# Patient Record
Sex: FEMALE | Race: OTHER | Employment: STUDENT | URBAN - METROPOLITAN AREA
[De-identification: names, ages, dates, MRNs, and addresses within clinical notes are randomized per-mention and may not be internally consistent; named-entity substitution may affect disease eponyms.]

---

## 2022-12-07 ENCOUNTER — OFFICE VISIT (OUTPATIENT)
Dept: FAMILY MEDICINE CLINIC | Facility: CLINIC | Age: 22
End: 2022-12-07
Payer: COMMERCIAL

## 2022-12-07 VITALS
OXYGEN SATURATION: 98 % | SYSTOLIC BLOOD PRESSURE: 123 MMHG | DIASTOLIC BLOOD PRESSURE: 82 MMHG | HEART RATE: 88 BPM | TEMPERATURE: 99 F | RESPIRATION RATE: 18 BRPM

## 2022-12-07 DIAGNOSIS — Z01.89 PATIENT REQUEST FOR DIAGNOSTIC TESTING: ICD-10-CM

## 2022-12-07 DIAGNOSIS — R68.89 FLU-LIKE SYMPTOMS: Primary | ICD-10-CM

## 2022-12-07 LAB
CONTROL LINE PRESENT WITH A CLEAR BACKGROUND (YES/NO): YES YES/NO
KIT LOT #: NORMAL NUMERIC
STREP GRP A CUL-SCR: NEGATIVE

## 2022-12-07 PROCEDURE — 3074F SYST BP LT 130 MM HG: CPT | Performed by: NURSE PRACTITIONER

## 2022-12-07 PROCEDURE — 3079F DIAST BP 80-89 MM HG: CPT | Performed by: NURSE PRACTITIONER

## 2022-12-07 PROCEDURE — 99202 OFFICE O/P NEW SF 15 MIN: CPT | Performed by: NURSE PRACTITIONER

## 2022-12-07 PROCEDURE — 87637 SARSCOV2&INF A&B&RSV AMP PRB: CPT | Performed by: NURSE PRACTITIONER

## 2022-12-07 PROCEDURE — 87880 STREP A ASSAY W/OPTIC: CPT | Performed by: NURSE PRACTITIONER

## 2022-12-09 LAB
FLUAV + FLUBV RNA SPEC NAA+PROBE: NOT DETECTED
FLUAV + FLUBV RNA SPEC NAA+PROBE: NOT DETECTED
RSV RNA SPEC NAA+PROBE: NOT DETECTED
SARS-COV-2 RNA RESP QL NAA+PROBE: DETECTED

## 2023-02-23 ENCOUNTER — OFFICE VISIT (OUTPATIENT)
Dept: OBGYN CLINIC | Facility: CLINIC | Age: 23
End: 2023-02-23

## 2023-02-23 ENCOUNTER — LAB ENCOUNTER (OUTPATIENT)
Dept: LAB | Age: 23
End: 2023-02-23
Attending: OBSTETRICS & GYNECOLOGY
Payer: COMMERCIAL

## 2023-02-23 VITALS
SYSTOLIC BLOOD PRESSURE: 109 MMHG | DIASTOLIC BLOOD PRESSURE: 67 MMHG | HEIGHT: 60 IN | WEIGHT: 146 LBS | BODY MASS INDEX: 28.66 KG/M2 | HEART RATE: 76 BPM

## 2023-02-23 DIAGNOSIS — Z11.3 SCREEN FOR STD (SEXUALLY TRANSMITTED DISEASE): ICD-10-CM

## 2023-02-23 DIAGNOSIS — Z13.220 ENCOUNTER FOR SCREENING FOR LIPID DISORDER: ICD-10-CM

## 2023-02-23 DIAGNOSIS — Z13.29 THYROID DISORDER SCREENING: ICD-10-CM

## 2023-02-23 DIAGNOSIS — N92.6 IRREGULAR MENSES: ICD-10-CM

## 2023-02-23 DIAGNOSIS — Z01.419 ENCOUNTER FOR WELL WOMAN EXAM WITH ROUTINE GYNECOLOGICAL EXAM: Primary | ICD-10-CM

## 2023-02-23 DIAGNOSIS — Z13.1 SCREENING FOR DIABETES MELLITUS (DM): ICD-10-CM

## 2023-02-23 LAB
CHOLEST SERPL-MCNC: 180 MG/DL (ref ?–200)
DHEA-S SERPL-MCNC: 166 UG/DL
EST. AVERAGE GLUCOSE BLD GHB EST-MCNC: 105 MG/DL (ref 68–126)
ESTRADIOL SERPL-MCNC: 46.8 PG/ML
FASTING PATIENT GLUCOSE ANSWER: YES
FASTING PATIENT LIPID ANSWER: YES
FSH SERPL-ACNC: 6 MIU/ML
GLUCOSE BLD-MCNC: 97 MG/DL (ref 70–99)
HBA1C MFR BLD: 5.3 % (ref ?–5.7)
HBV SURFACE AG SER-ACNC: <0.1 [IU]/L
HBV SURFACE AG SERPL QL IA: NONREACTIVE
HCV AB SERPL QL IA: NONREACTIVE
HDLC SERPL-MCNC: 76 MG/DL (ref 40–59)
INSULIN SERPL-ACNC: 9.4 MU/L (ref 3–25)
LDLC SERPL CALC-MCNC: 93 MG/DL (ref ?–100)
LH SERPL-ACNC: 10 MIU/ML
NONHDLC SERPL-MCNC: 104 MG/DL (ref ?–130)
PROLACTIN SERPL-MCNC: 10.4 NG/ML
T PALLIDUM AB SER QL IA: NONREACTIVE
TRIGL SERPL-MCNC: 59 MG/DL (ref 30–149)
TSI SER-ACNC: 1.19 MIU/ML (ref 0.36–3.74)
VLDLC SERPL CALC-MCNC: 10 MG/DL (ref 0–30)

## 2023-02-23 PROCEDURE — 82627 DEHYDROEPIANDROSTERONE: CPT

## 2023-02-23 PROCEDURE — 83036 HEMOGLOBIN GLYCOSYLATED A1C: CPT

## 2023-02-23 PROCEDURE — 82670 ASSAY OF TOTAL ESTRADIOL: CPT

## 2023-02-23 PROCEDURE — 84403 ASSAY OF TOTAL TESTOSTERONE: CPT

## 2023-02-23 PROCEDURE — 80061 LIPID PANEL: CPT

## 2023-02-23 PROCEDURE — 84443 ASSAY THYROID STIM HORMONE: CPT

## 2023-02-23 PROCEDURE — 3074F SYST BP LT 130 MM HG: CPT | Performed by: OBSTETRICS & GYNECOLOGY

## 2023-02-23 PROCEDURE — 87591 N.GONORRHOEAE DNA AMP PROB: CPT

## 2023-02-23 PROCEDURE — 36415 COLL VENOUS BLD VENIPUNCTURE: CPT

## 2023-02-23 PROCEDURE — 83002 ASSAY OF GONADOTROPIN (LH): CPT

## 2023-02-23 PROCEDURE — 99385 PREV VISIT NEW AGE 18-39: CPT | Performed by: OBSTETRICS & GYNECOLOGY

## 2023-02-23 PROCEDURE — 87340 HEPATITIS B SURFACE AG IA: CPT

## 2023-02-23 PROCEDURE — 86803 HEPATITIS C AB TEST: CPT

## 2023-02-23 PROCEDURE — 87389 HIV-1 AG W/HIV-1&-2 AB AG IA: CPT

## 2023-02-23 PROCEDURE — 83525 ASSAY OF INSULIN: CPT

## 2023-02-23 PROCEDURE — 3008F BODY MASS INDEX DOCD: CPT | Performed by: OBSTETRICS & GYNECOLOGY

## 2023-02-23 PROCEDURE — 87491 CHLMYD TRACH DNA AMP PROBE: CPT

## 2023-02-23 PROCEDURE — 84146 ASSAY OF PROLACTIN: CPT

## 2023-02-23 PROCEDURE — 83001 ASSAY OF GONADOTROPIN (FSH): CPT

## 2023-02-23 PROCEDURE — 84402 ASSAY OF FREE TESTOSTERONE: CPT

## 2023-02-23 PROCEDURE — 82947 ASSAY GLUCOSE BLOOD QUANT: CPT

## 2023-02-23 PROCEDURE — 86780 TREPONEMA PALLIDUM: CPT

## 2023-02-23 PROCEDURE — 3078F DIAST BP <80 MM HG: CPT | Performed by: OBSTETRICS & GYNECOLOGY

## 2023-02-23 RX ORDER — MULTIVITAMIN
TABLET ORAL
COMMUNITY

## 2023-02-23 RX ORDER — LEVOCETIRIZINE DIHYDROCHLORIDE 5 MG/1
5 TABLET, FILM COATED ORAL EVERY EVENING
COMMUNITY

## 2023-02-24 LAB
C TRACH DNA SPEC QL NAA+PROBE: NEGATIVE
N GONORRHOEA DNA SPEC QL NAA+PROBE: NEGATIVE

## 2023-03-01 LAB
SEX HORMONE BINDING GLOBULIN: 33 NMOL/L
TESTOSTERONE -MS, BIOAVAILAB: 21.1 NG/DL
TESTOSTERONE, -MS/MS: 45 NG/DL
TESTOSTERONE, FREE -MS/MS: 7.4 PG/ML

## 2023-03-02 ENCOUNTER — TELEPHONE (OUTPATIENT)
Dept: OBGYN CLINIC | Facility: CLINIC | Age: 23
End: 2023-03-02

## 2023-03-02 NOTE — TELEPHONE ENCOUNTER
Called patient and left a voicemail regarding results. Informed patient she needs to scheduled a follow up appointment after she completes ultrasound.

## 2023-03-02 NOTE — TELEPHONE ENCOUNTER
----- Message from Norma Shaikh MD sent at 2/26/2023  1:03 PM CST -----  Elevated E2 and insulin - needs ultrasound and f/u appt Norma Shaikh MD

## 2023-03-06 ENCOUNTER — TELEPHONE (OUTPATIENT)
Dept: OBGYN CLINIC | Facility: CLINIC | Age: 23
End: 2023-03-06

## 2023-03-06 NOTE — TELEPHONE ENCOUNTER
----- Message from Suzie Mora MD sent at 3/3/2023 11:02 AM CST -----  Needs ultrasound for elevated E2 and Testosterone.  Suzie Mora MD

## 2023-03-06 NOTE — TELEPHONE ENCOUNTER
Called and left message for patient to call back to scheduled an office visit to discuss elevated estradiol and Testerone. Dr. Goran Landon would like to do an in office ultrasound.

## 2023-03-07 ENCOUNTER — HOSPITAL ENCOUNTER (OUTPATIENT)
Dept: ULTRASOUND IMAGING | Age: 23
Discharge: HOME | End: 2023-03-07
Attending: OBSTETRICS & GYNECOLOGY
Payer: COMMERCIAL

## 2023-03-07 DIAGNOSIS — N92.6 IRREGULAR MENSES: ICD-10-CM

## 2023-03-07 PROCEDURE — 76856 US EXAM PELVIC COMPLETE: CPT | Performed by: OBSTETRICS & GYNECOLOGY

## 2023-03-07 PROCEDURE — 76830 TRANSVAGINAL US NON-OB: CPT | Performed by: OBSTETRICS & GYNECOLOGY

## 2023-03-09 ENCOUNTER — OFFICE VISIT (OUTPATIENT)
Dept: FAMILY MEDICINE CLINIC | Facility: CLINIC | Age: 23
End: 2023-03-09
Payer: COMMERCIAL

## 2023-03-09 VITALS
HEART RATE: 84 BPM | DIASTOLIC BLOOD PRESSURE: 68 MMHG | OXYGEN SATURATION: 98 % | WEIGHT: 146 LBS | BODY MASS INDEX: 28.66 KG/M2 | RESPIRATION RATE: 18 BRPM | SYSTOLIC BLOOD PRESSURE: 115 MMHG | HEIGHT: 60 IN | TEMPERATURE: 98 F

## 2023-03-09 DIAGNOSIS — R35.0 URINARY FREQUENCY: ICD-10-CM

## 2023-03-09 DIAGNOSIS — R30.0 DYSURIA: Primary | ICD-10-CM

## 2023-03-09 LAB
APPEARANCE: CLEAR
BILIRUBIN: NEGATIVE
GLUCOSE (URINE DIPSTICK): NEGATIVE MG/DL
KETONES (URINE DIPSTICK): NEGATIVE MG/DL
LEUKOCYTES: NEGATIVE
MULTISTIX LOT#: ABNORMAL NUMERIC
NITRITE, URINE: NEGATIVE
PH, URINE: 5.5 (ref 4.5–8)
PROTEIN (URINE DIPSTICK): NEGATIVE MG/DL
SPECIFIC GRAVITY: 1.01 (ref 1–1.03)
URINE-COLOR: YELLOW
UROBILINOGEN,SEMI-QN: 0.2 MG/DL (ref 0–1.9)

## 2023-03-09 PROCEDURE — 81003 URINALYSIS AUTO W/O SCOPE: CPT | Performed by: NURSE PRACTITIONER

## 2023-03-09 PROCEDURE — 87086 URINE CULTURE/COLONY COUNT: CPT | Performed by: NURSE PRACTITIONER

## 2023-03-09 PROCEDURE — 3078F DIAST BP <80 MM HG: CPT | Performed by: NURSE PRACTITIONER

## 2023-03-09 PROCEDURE — 99213 OFFICE O/P EST LOW 20 MIN: CPT | Performed by: NURSE PRACTITIONER

## 2023-03-09 PROCEDURE — 3074F SYST BP LT 130 MM HG: CPT | Performed by: NURSE PRACTITIONER

## 2023-03-09 PROCEDURE — 3008F BODY MASS INDEX DOCD: CPT | Performed by: NURSE PRACTITIONER

## 2023-03-17 ENCOUNTER — OFFICE VISIT (OUTPATIENT)
Dept: OBGYN CLINIC | Facility: CLINIC | Age: 23
End: 2023-03-17

## 2023-03-17 DIAGNOSIS — E28.2 PCOS (POLYCYSTIC OVARIAN SYNDROME): Primary | ICD-10-CM

## 2023-03-17 PROCEDURE — 99214 OFFICE O/P EST MOD 30 MIN: CPT | Performed by: OBSTETRICS & GYNECOLOGY

## 2023-03-17 RX ORDER — NORGESTIMATE AND ETHINYL ESTRADIOL 0.25-0.035
1 KIT ORAL DAILY
Qty: 84 TABLET | Refills: 4 | Status: SHIPPED | OUTPATIENT
Start: 2023-03-17 | End: 2024-03-16

## 2023-03-17 NOTE — PATIENT INSTRUCTIONS
Dietary changes and exercise and weight loss are cornerstones of management of PCOS / diabetes / obesity /  weight loss      Check EPA list of 'Clean 15' and 'dirty dozen' while deciding to buy organic   Organic foods have less pesticides and chemical contaminants      - Diet changes :   Best evidence is for whole food plant based/mediterraean diet for heart health/diabetes (based on [employer], Valders study, PURE study, Pritikin)  Small portion sized meals. Low carb - 30-40 gm with each meal.  NO Sugar, Soda, Juices and Sweets. --- Fill half of your plate with low carb veggies and greens as discussed. -- eat low sugar fruits apple , pears and berries : blueberries, strawberries, raspberries etc    -- avoid tropical fruits like pineapple,ilana , grapes , watermelon, papaya   -- about 25% fruits and 75% veggies  to reduce sugar intake)  More freshly prepared meals than frozen,   More plant based foods than processed meats. Incorporate good fats - Eg :Extra virgin olive oil, Nuts, Avocado. Provided information handouts on carb counting and obesity. Consider ''Time restricted eating / feeding '' also called ''intermittent fasting'': (Resource:  \"The Complete Guide to Fasting\" by Dr Garcia Brian)  Eating all your small healthy meals in a narrow window 6-8 hour and only drinking water or non sugar or non carb liquids ( black tea, black coffee or green tea) rest of the time with a minimum overnight fasting periods of 12 hours     consider starting B complex 1 tablet daily      Exercise :   Brisk walk 30-45 min everyday or a total of 150 min per week    Weight loss target :   10 % with 1/2-1 lb per week over 6 to 12 months      Follow up 3 months

## 2025-03-15 ENCOUNTER — APPOINTMENT (OUTPATIENT)
Dept: CT IMAGING | Age: 25
End: 2025-03-15
Attending: STUDENT IN AN ORGANIZED HEALTH CARE EDUCATION/TRAINING PROGRAM
Payer: COMMERCIAL

## 2025-03-15 ENCOUNTER — HOSPITAL ENCOUNTER (INPATIENT)
Facility: HOSPITAL | Age: 25
LOS: 2 days | Discharge: HOME | End: 2025-03-18
Attending: STUDENT IN AN ORGANIZED HEALTH CARE EDUCATION/TRAINING PROGRAM | Admitting: STUDENT IN AN ORGANIZED HEALTH CARE EDUCATION/TRAINING PROGRAM
Payer: COMMERCIAL

## 2025-03-15 DIAGNOSIS — K37 APPENDICITIS: ICD-10-CM

## 2025-03-15 DIAGNOSIS — K35.32 ACUTE APPENDICITIS WITH RUPTURE: Primary | ICD-10-CM

## 2025-03-15 LAB
ALBUMIN SERPL-MCNC: 4.9 G/DL (ref 3.2–4.8)
ALBUMIN/GLOB SERPL: 1.5 {RATIO} (ref 1–2)
ALP LIVER SERPL-CCNC: 84 U/L
ALT SERPL-CCNC: 18 U/L
ANION GAP SERPL CALC-SCNC: 7 MMOL/L (ref 0–18)
AST SERPL-CCNC: 20 U/L (ref ?–34)
B-HCG UR QL: NEGATIVE
BASOPHILS # BLD AUTO: 0.02 X10(3) UL (ref 0–0.2)
BASOPHILS NFR BLD AUTO: 0.1 %
BILIRUB SERPL-MCNC: 1 MG/DL (ref 0.3–1.2)
BILIRUB UR QL CFM: NEGATIVE
BUN BLD-MCNC: 12 MG/DL (ref 9–23)
CALCIUM BLD-MCNC: 9.8 MG/DL (ref 8.7–10.6)
CHLORIDE SERPL-SCNC: 102 MMOL/L (ref 98–112)
CO2 SERPL-SCNC: 25 MMOL/L (ref 21–32)
COLOR UR AUTO: YELLOW
CREAT BLD-MCNC: 0.74 MG/DL
EGFRCR SERPLBLD CKD-EPI 2021: 116 ML/MIN/1.73M2 (ref 60–?)
EOSINOPHIL # BLD AUTO: 0.26 X10(3) UL (ref 0–0.7)
EOSINOPHIL NFR BLD AUTO: 1.8 %
ERYTHROCYTE [DISTWIDTH] IN BLOOD BY AUTOMATED COUNT: 13.1 %
GLOBULIN PLAS-MCNC: 3.2 G/DL (ref 2–3.5)
GLUCOSE BLD-MCNC: 116 MG/DL (ref 70–99)
GLUCOSE UR STRIP.AUTO-MCNC: NEGATIVE MG/DL
HCT VFR BLD AUTO: 36.4 %
HGB BLD-MCNC: 12.5 G/DL
IMM GRANULOCYTES # BLD AUTO: 0.06 X10(3) UL (ref 0–1)
IMM GRANULOCYTES NFR BLD: 0.4 %
KETONES UR STRIP.AUTO-MCNC: >=160 MG/DL
LIPASE SERPL-CCNC: 45 U/L (ref 12–53)
LYMPHOCYTES # BLD AUTO: 1.15 X10(3) UL (ref 1–4)
LYMPHOCYTES NFR BLD AUTO: 8 %
MCH RBC QN AUTO: 32.1 PG (ref 26–34)
MCHC RBC AUTO-ENTMCNC: 34.3 G/DL (ref 31–37)
MCV RBC AUTO: 93.3 FL
MONOCYTES # BLD AUTO: 0.9 X10(3) UL (ref 0.1–1)
MONOCYTES NFR BLD AUTO: 6.2 %
NEUTROPHILS # BLD AUTO: 12.05 X10 (3) UL (ref 1.5–7.7)
NEUTROPHILS # BLD AUTO: 12.05 X10(3) UL (ref 1.5–7.7)
NEUTROPHILS NFR BLD AUTO: 83.5 %
NITRITE UR QL STRIP.AUTO: NEGATIVE
OSMOLALITY SERPL CALC.SUM OF ELEC: 279 MOSM/KG (ref 275–295)
PH UR STRIP.AUTO: 6 [PH] (ref 5–8)
PLATELET # BLD AUTO: 243 10(3)UL (ref 150–450)
POTASSIUM SERPL-SCNC: 3.8 MMOL/L (ref 3.5–5.1)
PROT SERPL-MCNC: 8.1 G/DL (ref 5.7–8.2)
RBC # BLD AUTO: 3.9 X10(6)UL
SODIUM SERPL-SCNC: 134 MMOL/L (ref 136–145)
SP GR UR STRIP.AUTO: 1.02 (ref 1–1.03)
UROBILINOGEN UR STRIP.AUTO-MCNC: 4 MG/DL
WBC # BLD AUTO: 14.4 X10(3) UL (ref 4–11)

## 2025-03-15 PROCEDURE — 74177 CT ABD & PELVIS W/CONTRAST: CPT | Performed by: STUDENT IN AN ORGANIZED HEALTH CARE EDUCATION/TRAINING PROGRAM

## 2025-03-15 RX ORDER — KETOROLAC TROMETHAMINE 15 MG/ML
15 INJECTION, SOLUTION INTRAMUSCULAR; INTRAVENOUS ONCE
Status: COMPLETED | OUTPATIENT
Start: 2025-03-15 | End: 2025-03-15

## 2025-03-15 RX ORDER — MORPHINE SULFATE 4 MG/ML
4 INJECTION, SOLUTION INTRAMUSCULAR; INTRAVENOUS ONCE
Status: COMPLETED | OUTPATIENT
Start: 2025-03-15 | End: 2025-03-15

## 2025-03-15 RX ORDER — MAGNESIUM HYDROXIDE/ALUMINUM HYDROXICE/SIMETHICONE 120; 1200; 1200 MG/30ML; MG/30ML; MG/30ML
30 SUSPENSION ORAL ONCE
Status: COMPLETED | OUTPATIENT
Start: 2025-03-15 | End: 2025-03-15

## 2025-03-16 ENCOUNTER — ANESTHESIA (OUTPATIENT)
Dept: SURGERY | Facility: HOSPITAL | Age: 25
End: 2025-03-16
Payer: COMMERCIAL

## 2025-03-16 ENCOUNTER — ANESTHESIA EVENT (OUTPATIENT)
Dept: SURGERY | Facility: HOSPITAL | Age: 25
End: 2025-03-16
Payer: COMMERCIAL

## 2025-03-16 PROCEDURE — 0DTJ4ZZ RESECTION OF APPENDIX, PERCUTANEOUS ENDOSCOPIC APPROACH: ICD-10-PCS | Performed by: STUDENT IN AN ORGANIZED HEALTH CARE EDUCATION/TRAINING PROGRAM

## 2025-03-16 PROCEDURE — 44970 LAPAROSCOPY APPENDECTOMY: CPT | Performed by: STUDENT IN AN ORGANIZED HEALTH CARE EDUCATION/TRAINING PROGRAM

## 2025-03-16 PROCEDURE — 99222 1ST HOSP IP/OBS MODERATE 55: CPT | Performed by: STUDENT IN AN ORGANIZED HEALTH CARE EDUCATION/TRAINING PROGRAM

## 2025-03-16 PROCEDURE — 3E0T3BZ INTRODUCTION OF ANESTHETIC AGENT INTO PERIPHERAL NERVES AND PLEXI, PERCUTANEOUS APPROACH: ICD-10-PCS | Performed by: INTERNAL MEDICINE

## 2025-03-16 RX ORDER — HYDROMORPHONE HYDROCHLORIDE 1 MG/ML
0.2 INJECTION, SOLUTION INTRAMUSCULAR; INTRAVENOUS; SUBCUTANEOUS EVERY 5 MIN PRN
Status: DISCONTINUED | OUTPATIENT
Start: 2025-03-16 | End: 2025-03-16 | Stop reason: HOSPADM

## 2025-03-16 RX ORDER — ONDANSETRON 2 MG/ML
INJECTION INTRAMUSCULAR; INTRAVENOUS AS NEEDED
Status: DISCONTINUED | OUTPATIENT
Start: 2025-03-16 | End: 2025-03-16 | Stop reason: SURG

## 2025-03-16 RX ORDER — NALOXONE HYDROCHLORIDE 0.4 MG/ML
0.08 INJECTION, SOLUTION INTRAMUSCULAR; INTRAVENOUS; SUBCUTANEOUS AS NEEDED
Status: DISCONTINUED | OUTPATIENT
Start: 2025-03-16 | End: 2025-03-16 | Stop reason: HOSPADM

## 2025-03-16 RX ORDER — DEXAMETHASONE SODIUM PHOSPHATE 4 MG/ML
VIAL (ML) INJECTION AS NEEDED
Status: DISCONTINUED | OUTPATIENT
Start: 2025-03-16 | End: 2025-03-16 | Stop reason: SURG

## 2025-03-16 RX ORDER — ACETAMINOPHEN 500 MG
1000 TABLET ORAL ONCE AS NEEDED
Status: DISCONTINUED | OUTPATIENT
Start: 2025-03-16 | End: 2025-03-16 | Stop reason: HOSPADM

## 2025-03-16 RX ORDER — DIPHENHYDRAMINE HYDROCHLORIDE 50 MG/ML
12.5 INJECTION, SOLUTION INTRAMUSCULAR; INTRAVENOUS AS NEEDED
Status: DISCONTINUED | OUTPATIENT
Start: 2025-03-16 | End: 2025-03-16 | Stop reason: HOSPADM

## 2025-03-16 RX ORDER — ONDANSETRON 2 MG/ML
4 INJECTION INTRAMUSCULAR; INTRAVENOUS EVERY 6 HOURS PRN
Status: DISCONTINUED | OUTPATIENT
Start: 2025-03-16 | End: 2025-03-16 | Stop reason: HOSPADM

## 2025-03-16 RX ORDER — ACETAMINOPHEN 500 MG
1000 TABLET ORAL EVERY 8 HOURS SCHEDULED
Status: DISCONTINUED | OUTPATIENT
Start: 2025-03-16 | End: 2025-03-18

## 2025-03-16 RX ORDER — ONDANSETRON 2 MG/ML
INJECTION INTRAMUSCULAR; INTRAVENOUS
Status: COMPLETED
Start: 2025-03-16 | End: 2025-03-16

## 2025-03-16 RX ORDER — HYDROMORPHONE HYDROCHLORIDE 1 MG/ML
0.8 INJECTION, SOLUTION INTRAMUSCULAR; INTRAVENOUS; SUBCUTANEOUS EVERY 2 HOUR PRN
Status: DISCONTINUED | OUTPATIENT
Start: 2025-03-16 | End: 2025-03-18

## 2025-03-16 RX ORDER — SODIUM CHLORIDE, SODIUM LACTATE, POTASSIUM CHLORIDE, CALCIUM CHLORIDE 600; 310; 30; 20 MG/100ML; MG/100ML; MG/100ML; MG/100ML
INJECTION, SOLUTION INTRAVENOUS CONTINUOUS
Status: DISCONTINUED | OUTPATIENT
Start: 2025-03-16 | End: 2025-03-16 | Stop reason: HOSPADM

## 2025-03-16 RX ORDER — SODIUM CHLORIDE 9 MG/ML
INJECTION, SOLUTION INTRAVENOUS CONTINUOUS
Status: DISCONTINUED | OUTPATIENT
Start: 2025-03-16 | End: 2025-03-16

## 2025-03-16 RX ORDER — ROCURONIUM BROMIDE 10 MG/ML
INJECTION, SOLUTION INTRAVENOUS AS NEEDED
Status: DISCONTINUED | OUTPATIENT
Start: 2025-03-16 | End: 2025-03-16 | Stop reason: SURG

## 2025-03-16 RX ORDER — HYDROCODONE BITARTRATE AND ACETAMINOPHEN 5; 325 MG/1; MG/1
2 TABLET ORAL ONCE AS NEEDED
Status: DISCONTINUED | OUTPATIENT
Start: 2025-03-16 | End: 2025-03-16 | Stop reason: HOSPADM

## 2025-03-16 RX ORDER — SODIUM CHLORIDE, SODIUM LACTATE, POTASSIUM CHLORIDE, CALCIUM CHLORIDE 600; 310; 30; 20 MG/100ML; MG/100ML; MG/100ML; MG/100ML
INJECTION, SOLUTION INTRAVENOUS CONTINUOUS PRN
Status: DISCONTINUED | OUTPATIENT
Start: 2025-03-16 | End: 2025-03-16 | Stop reason: SURG

## 2025-03-16 RX ORDER — PHENYLEPHRINE HCL 10 MG/ML
VIAL (ML) INJECTION AS NEEDED
Status: DISCONTINUED | OUTPATIENT
Start: 2025-03-16 | End: 2025-03-16 | Stop reason: SURG

## 2025-03-16 RX ORDER — PROCHLORPERAZINE EDISYLATE 5 MG/ML
5 INJECTION INTRAMUSCULAR; INTRAVENOUS EVERY 8 HOURS PRN
Status: DISCONTINUED | OUTPATIENT
Start: 2025-03-16 | End: 2025-03-18

## 2025-03-16 RX ORDER — LIDOCAINE HYDROCHLORIDE 10 MG/ML
INJECTION, SOLUTION EPIDURAL; INFILTRATION; INTRACAUDAL; PERINEURAL AS NEEDED
Status: DISCONTINUED | OUTPATIENT
Start: 2025-03-16 | End: 2025-03-16 | Stop reason: SURG

## 2025-03-16 RX ORDER — MEPERIDINE HYDROCHLORIDE 25 MG/ML
12.5 INJECTION INTRAMUSCULAR; INTRAVENOUS; SUBCUTANEOUS AS NEEDED
Status: DISCONTINUED | OUTPATIENT
Start: 2025-03-16 | End: 2025-03-16 | Stop reason: HOSPADM

## 2025-03-16 RX ORDER — PROCHLORPERAZINE EDISYLATE 5 MG/ML
5 INJECTION INTRAMUSCULAR; INTRAVENOUS EVERY 8 HOURS PRN
Status: DISCONTINUED | OUTPATIENT
Start: 2025-03-16 | End: 2025-03-16 | Stop reason: HOSPADM

## 2025-03-16 RX ORDER — HYDROMORPHONE HYDROCHLORIDE 1 MG/ML
0.6 INJECTION, SOLUTION INTRAMUSCULAR; INTRAVENOUS; SUBCUTANEOUS EVERY 5 MIN PRN
Status: DISCONTINUED | OUTPATIENT
Start: 2025-03-16 | End: 2025-03-16 | Stop reason: HOSPADM

## 2025-03-16 RX ORDER — HEPARIN SODIUM 5000 [USP'U]/ML
5000 INJECTION, SOLUTION INTRAVENOUS; SUBCUTANEOUS EVERY 12 HOURS SCHEDULED
Status: DISCONTINUED | OUTPATIENT
Start: 2025-03-16 | End: 2025-03-18

## 2025-03-16 RX ORDER — KETOROLAC TROMETHAMINE 30 MG/ML
30 INJECTION, SOLUTION INTRAMUSCULAR; INTRAVENOUS EVERY 6 HOURS
Status: DISCONTINUED | OUTPATIENT
Start: 2025-03-16 | End: 2025-03-18

## 2025-03-16 RX ORDER — ONDANSETRON 2 MG/ML
4 INJECTION INTRAMUSCULAR; INTRAVENOUS EVERY 6 HOURS PRN
Status: DISCONTINUED | OUTPATIENT
Start: 2025-03-16 | End: 2025-03-18

## 2025-03-16 RX ORDER — OXYCODONE HYDROCHLORIDE 5 MG/1
10 TABLET ORAL EVERY 4 HOURS PRN
Status: DISCONTINUED | OUTPATIENT
Start: 2025-03-16 | End: 2025-03-18

## 2025-03-16 RX ORDER — HYDROCODONE BITARTRATE AND ACETAMINOPHEN 5; 325 MG/1; MG/1
1 TABLET ORAL ONCE AS NEEDED
Status: DISCONTINUED | OUTPATIENT
Start: 2025-03-16 | End: 2025-03-16 | Stop reason: HOSPADM

## 2025-03-16 RX ORDER — OXYCODONE HYDROCHLORIDE 5 MG/1
5 TABLET ORAL EVERY 4 HOURS PRN
Status: DISCONTINUED | OUTPATIENT
Start: 2025-03-16 | End: 2025-03-18

## 2025-03-16 RX ORDER — HYDROMORPHONE HYDROCHLORIDE 1 MG/ML
0.4 INJECTION, SOLUTION INTRAMUSCULAR; INTRAVENOUS; SUBCUTANEOUS EVERY 5 MIN PRN
Status: DISCONTINUED | OUTPATIENT
Start: 2025-03-16 | End: 2025-03-16 | Stop reason: HOSPADM

## 2025-03-16 RX ORDER — HYDROMORPHONE HYDROCHLORIDE 1 MG/ML
0.4 INJECTION, SOLUTION INTRAMUSCULAR; INTRAVENOUS; SUBCUTANEOUS EVERY 2 HOUR PRN
Status: DISCONTINUED | OUTPATIENT
Start: 2025-03-16 | End: 2025-03-18

## 2025-03-16 RX ORDER — LIDOCAINE HYDROCHLORIDE AND EPINEPHRINE 10; 10 MG/ML; UG/ML
INJECTION, SOLUTION INFILTRATION; PERINEURAL AS NEEDED
Status: DISCONTINUED | OUTPATIENT
Start: 2025-03-16 | End: 2025-03-16 | Stop reason: HOSPADM

## 2025-03-16 RX ORDER — BUPIVACAINE HYDROCHLORIDE 2.5 MG/ML
INJECTION, SOLUTION EPIDURAL; INFILTRATION; INTRACAUDAL; PERINEURAL AS NEEDED
Status: DISCONTINUED | OUTPATIENT
Start: 2025-03-16 | End: 2025-03-16 | Stop reason: HOSPADM

## 2025-03-16 RX ORDER — MIDAZOLAM HYDROCHLORIDE 1 MG/ML
1 INJECTION INTRAMUSCULAR; INTRAVENOUS EVERY 5 MIN PRN
Status: DISCONTINUED | OUTPATIENT
Start: 2025-03-16 | End: 2025-03-16 | Stop reason: HOSPADM

## 2025-03-16 RX ORDER — METRONIDAZOLE 500 MG/100ML
INJECTION, SOLUTION INTRAVENOUS AS NEEDED
Status: DISCONTINUED | OUTPATIENT
Start: 2025-03-16 | End: 2025-03-16 | Stop reason: SURG

## 2025-03-16 RX ADMIN — ROCURONIUM BROMIDE 50 MG: 10 INJECTION, SOLUTION INTRAVENOUS at 08:02:00

## 2025-03-16 RX ADMIN — SODIUM CHLORIDE, SODIUM LACTATE, POTASSIUM CHLORIDE, CALCIUM CHLORIDE: 600; 310; 30; 20 INJECTION, SOLUTION INTRAVENOUS at 07:55:00

## 2025-03-16 RX ADMIN — DEXAMETHASONE SODIUM PHOSPHATE 4 MG: 4 MG/ML VIAL (ML) INJECTION at 08:09:00

## 2025-03-16 RX ADMIN — PHENYLEPHRINE HCL 50 MCG: 10 MG/ML VIAL (ML) INJECTION at 08:11:00

## 2025-03-16 RX ADMIN — ONDANSETRON 4 MG: 2 INJECTION INTRAMUSCULAR; INTRAVENOUS at 09:45:00

## 2025-03-16 RX ADMIN — METRONIDAZOLE 500 MG: 500 INJECTION, SOLUTION INTRAVENOUS at 08:08:00

## 2025-03-16 RX ADMIN — ROCURONIUM BROMIDE 5 MG: 10 INJECTION, SOLUTION INTRAVENOUS at 08:39:00

## 2025-03-16 RX ADMIN — ROCURONIUM BROMIDE 5 MG: 10 INJECTION, SOLUTION INTRAVENOUS at 08:57:00

## 2025-03-16 RX ADMIN — LIDOCAINE HYDROCHLORIDE 50 MG: 10 INJECTION, SOLUTION EPIDURAL; INFILTRATION; INTRACAUDAL; PERINEURAL at 08:02:00

## 2025-03-16 NOTE — ED QUICK NOTES
Orders for admission, patient is aware of plan and ready to go upstairs. Any questions, please call ED RN Khris at extension [phone].     Patient Covid vaccination status: Fully vaccinated     COVID Test Ordered in ED: None    COVID Suspicion at Admission: N/A    Running Infusions:  None    Mental Status/LOC at time of transport: A&O*4/4    Other pertinent information:   CIWA score: N/A   NIH score:  N/A

## 2025-03-16 NOTE — ED PROVIDER NOTES
History     Chief Complaint   Patient presents with    Abdomen/Flank Pain       HPI    25 year old female presents with abdominal pain.  Thursday patient states she had a couple apples and afterwards felt a lot of bloating, abdominal discomfort, emesis.  Since then has had difficulty with oral intake due to poor appetite, shortly had some crackers today.  No further vomiting or diarrhea.  She has dysuria since yesterday.  She feels dehydrated.  Went to an urgent care and was advised to come to the ER.  She does have a history of PCOS but does not have the symptoms due to the condition previously.  LMP January, typically irregular for patient.          Past Medical History:    PCOS (polycystic ovarian syndrome)    Seasonal allergies       History reviewed. No pertinent surgical history.    Social History     Socioeconomic History    Marital status: Single   Tobacco Use    Smoking status: Never    Smokeless tobacco: Never   Vaping Use    Vaping status: Never Used   Substance and Sexual Activity    Alcohol use: Yes     Comment: occ    Drug use: Not Currently                   Physical Exam     ED Triage Vitals [03/15/25 2025]   /63   Pulse 117   Resp 18   Temp 98.5 °F (36.9 °C)   Temp src Oral   SpO2 96 %   O2 Device None (Room air)       Physical Exam  Constitutional:       General: She is in acute distress.   Eyes:      Extraocular Movements: Extraocular movements intact.   Cardiovascular:      Rate and Rhythm: Normal rate.      Pulses: Normal pulses.   Pulmonary:      Effort: Pulmonary effort is normal. No respiratory distress.   Abdominal:      General: There is distension.      Palpations: Abdomen is soft.      Tenderness: There is abdominal tenderness in the right lower quadrant. There is no guarding or rebound.   Musculoskeletal:      Cervical back: Normal range of motion.   Neurological:      General: No focal deficit present.      Mental Status: She is alert.              ED Course     Labs Reviewed    URINALYSIS, ROUTINE - Abnormal; Notable for the following components:       Result Value    Clarity Urine Cloudy (*)     Ketones Urine >=160 (*)     Blood Urine Small (*)     Protein Urine 30 mg/dL (*)     Urobilinogen Urine 4.0 (*)     Leukocyte Esterase Urine Trace (*)     All other components within normal limits   UA MICROSCOPIC ONLY, URINE - Abnormal; Notable for the following components:    Bacteria Urine 1+ (*)     Squamous Epi. Cells Many (*)     All other components within normal limits   CBC WITH DIFFERENTIAL WITH PLATELET - Abnormal; Notable for the following components:    WBC 14.4 (*)     Neutrophil Absolute Prelim 12.05 (*)     Neutrophil Absolute 12.05 (*)     All other components within normal limits   COMP METABOLIC PANEL (14) - Abnormal; Notable for the following components:    Glucose 116 (*)     Sodium 134 (*)     Albumin 4.9 (*)     All other components within normal limits   ICTOTEST - Normal   LIPASE - Normal   POCT PREGNANCY URINE - Normal   SCAN SLIDE     CT ABDOMEN+PELVIS(CONTRAST ONLY)(CPT=74177)    Result Date: 3/15/2025  CONCLUSION:   1. Findings concerning for ruptured acute appendicitis.  There are multiple appendicoliths noted along the course of the appendix measuring up to 6 mm.  There are moderate to severe inflammatory changes surrounding the appendix extending into the right lower quadrant.  The appendix has a reference diameter of approximately 12 mm.  There is secondary inflammation of the surrounding small bowel loops.  There is a small amount of free fluid tracking into the pelvis.  No free air or abscess formation.  Clinical correlation recommended.  2. Findings concerning for cystitis.  Clinical correlation recommended.  3. Diffuse fatty infiltration of the liver.   Critical results were discussed with Dr. Bishop at 2137 hours on 3/15/2025. Critical results were read back.  LOCATION:  [facility]   Dictated by (CST): Jaron Ortiz MD on 3/15/2025 at 9:34 PM     Finalized by  (CST): Jaron Ortiz MD on 3/15/2025 at 9:38 PM            [facility]     Vitals:    03/15/25 2025 03/15/25 2159   BP: 111/63 114/59   Pulse: 117 97   Resp: 18 18   Temp: 98.5 °F (36.9 °C)    TempSrc: Oral    SpO2: 96% 100%   Weight: 51.3 kg    Height: 152.4 cm (5')        Colitis, gastroenteritis, appendicitis, cystitis on differential.  Uncomfortable but nontoxic-appearing.  Labs, urine, CT.    ED Course as of 03/15/25 2205  ------------------------------------------------------------  Time: 03/15 2121  Comment: Urinalysis consistent with dehydration, leukocytosis noted.  Labs otherwise with reassuring renal function and LFTs, lipase normal.  ------------------------------------------------------------  Time: 03/15 2144  Comment: My interpretation of CT with inflammatory changes of the right lower quadrant.  Radiologist is noting what appears to be ruptured appendicitis.  Free fluid, associated inflammatory changes.  Will start on antibiotics and consult surgery, patient will be admitted for further care         Disposition and Plan     Clinical Impression:  1. Acute appendicitis with rupture        Disposition:  Admit    Follow-up:  No follow-up provider specified.    Medications Prescribed:  Current Discharge Medication List          Hospital Problems       Present on Admission  Date Reviewed: 3/17/2023            ICD-10-CM Noted POA    * (Principal) Acute appendicitis with rupture K35.32 3/15/2025 Unknown

## 2025-03-16 NOTE — PLAN OF CARE
NURSING ADMISSION NOTE      Patient admitted to unit via wheelchair. Pt is here with acute appendicitis rupture. Pt is AOx4, RA, VSS, up ad bradley. No c/o pain. IVF infusing at 100ml/hr. IV ABX.  Family at bedside and updated on POC.  Patient admitted via Cart  Oriented to room.  Safety precautions initiated.  Bed in low position.  Call light in reach.

## 2025-03-16 NOTE — ANESTHESIA PREPROCEDURE EVALUATION
PRE-OP EVALUATION    Patient Name: Demi Mullins    Admit Diagnosis: Acute appendicitis with rupture [K35.32]    Pre-op Diagnosis: Appendicitis [K37]    LAPAROSCOPIC APPENDECTOMY POSSIBLE OPEN    Anesthesia Procedure: LAPAROSCOPIC APPENDECTOMY POSSIBLE OPEN (Abdomen)    Surgeons and Role:     * Luz Elena Sullivan MD - Primary    Pre-op vitals reviewed.  Temp: 97.9 °F (36.6 °C)  Pulse: 89  Resp: 18  BP: 98/48  SpO2: 98 %  Body mass index is 22.07 kg/m².    Current medications reviewed.  Hospital Medications:   sodium chloride 0.9% infusion   Intravenous Continuous    heparin (Porcine) 5000 UNIT/ML injection 5,000 Units  5,000 Units Subcutaneous 2 times per day    acetaminophen (Tylenol Extra Strength) tab 1,000 mg  1,000 mg Oral Q8H SHILPI    ketorolac (Toradol) 30 MG/ML injection 30 mg  30 mg Intravenous Q6H    oxyCODONE immediate release tab 5 mg  5 mg Oral Q4H PRN    Or    oxyCODONE immediate release tab 10 mg  10 mg Oral Q4H PRN    HYDROmorphone (Dilaudid) 1 MG/ML injection 0.4 mg  0.4 mg Intravenous Q2H PRN    Or    HYDROmorphone (Dilaudid) 1 MG/ML injection 0.8 mg  0.8 mg Intravenous Q2H PRN    ondansetron (Zofran) 4 MG/2ML injection 4 mg  4 mg Intravenous Q6H PRN    prochlorperazine (Compazine) 10 MG/2ML injection 5 mg  5 mg Intravenous Q8H PRN    piperacillin-tazobactam (Zosyn) 3.375 g in dextrose 5% 100 mL IVPB-ADDV  3.375 g Intravenous Q8H    [COMPLETED] sodium chloride 0.9 % IV bolus 1,000 mL  1,000 mL Intravenous Once    [COMPLETED] alum-mag hydroxide-simethicone (Maalox) 200-200-20 MG/5ML oral suspension 30 mL  30 mL Oral Once    [COMPLETED] ketorolac (Toradol) 15 MG/ML injection 15 mg  15 mg Intravenous Once    [COMPLETED] iopamidol 76% (ISOVUE-370) injection for power injector  80 mL Intravenous ONCE PRN    [COMPLETED] piperacillin-tazobactam (Zosyn) 4.5 g in dextrose 5% 100 mL IVPB-ADDV  4.5 g Intravenous Once    [COMPLETED] morphINE PF 4 MG/ML injection 4 mg  4 mg Intravenous Once       Outpatient  Medications:   Prescriptions Prior to Admission[1]    Allergies: Patient has no known allergies.      Anesthesia Evaluation    Patient summary reviewed.    Anesthetic Complications  (-) history of anesthetic complications         GI/Hepatic/Renal    Negative GI/hepatic/renal ROS.                             Cardiovascular    Negative cardiovascular ROS.                                                   Endo/Other    Negative endo/other ROS.                              Pulmonary    Negative pulmonary ROS.                       Neuro/Psych    Negative neuro/psych ROS.                                  History reviewed. No pertinent surgical history.  Social History     Socioeconomic History    Marital status: Single   Tobacco Use    Smoking status: Never    Smokeless tobacco: Never   Vaping Use    Vaping status: Never Used   Substance and Sexual Activity    Alcohol use: Yes     Comment: occ    Drug use: Not Currently     History   Drug Use Unknown     Available pre-op labs reviewed.  Lab Results   Component Value Date    WBC 14.4 (H) 03/15/2025    RBC 3.90 03/15/2025    HGB 12.5 03/15/2025    HCT 36.4 03/15/2025    MCV 93.3 03/15/2025    MCH 32.1 03/15/2025    MCHC 34.3 03/15/2025    RDW 13.1 03/15/2025    .0 03/15/2025     Lab Results   Component Value Date     (L) 03/15/2025    K 3.8 03/15/2025     03/15/2025    CO2 25.0 03/15/2025    BUN 12 03/15/2025    CREATSERUM 0.74 03/15/2025     (H) 03/15/2025    CA 9.8 03/15/2025            Airway      Mallampati: III  Mouth opening: 3 FB  TM distance: 4 - 6 cm   Cardiovascular             Dental             Pulmonary                     Other findings              ASA: 1 and emergent  Plan: general  NPO status verified and     Post-procedure pain management plan discussed with surgeon and patient.    Comment: GETA/LMA discussed in detail.  Risk of complications discussed including but not limited to sore throat, cough, PONV discussed. Also,  discussed risks including dental injury particularly on any weakened, treated or diseased teeth & pt wishes to proceed  All questions answered.    Plan/risks discussed with: patient                Present on Admission:  **None**             [1]   Medications Prior to Admission   Medication Sig Dispense Refill Last Dose/Taking    Multiple Vitamin (MULTI-VITAMIN DAILY) Oral Tab Take by mouth.   Past Week    Norgestimate-Eth Estradiol (ORTHO-CYCLEN, 28,) 0.25-35 MG-MCG Oral Tab Take 1 tablet by mouth daily. 84 tablet 4     levocetirizine 5 MG Oral Tab Take 1 tablet (5 mg total) by mouth every evening. (Patient not taking: Reported on 3/15/2025)   Unknown

## 2025-03-16 NOTE — ANESTHESIA PROCEDURE NOTES
Airway  Date/Time: 3/16/2025 8:05 AM  Urgency: elective      General Information and Staff    Patient location during procedure: OR  Anesthesiologist: Wally Jamison MD  Performed: anesthesiologist   Performed by: Wally Jamison MD  Authorized by: Wally Jamison MD      Indications and Patient Condition  Indications for airway management: anesthesia  Sedation level: deep  Preoxygenated: yes  Patient position: sniffing  Mask difficulty assessment: 1 - vent by mask    Final Airway Details  Final airway type: endotracheal airway      Successful airway: ETT  Cuffed: yes   Successful intubation technique: Video laryngoscopy  Endotracheal tube insertion site: oral  Blade: GlideScope  Blade size: #3  ETT size (mm): 7.0    Placement verified by: capnometry   Cuff volume (mL): 7  Measured from: lips  ETT to lips (cm): 21  Number of attempts at approach: 1    Additional Comments  atraumatic

## 2025-03-16 NOTE — OPERATIVE REPORT
OPERATIVE NOTE    Demi Mullins Location: OR   CSN [id number] MRN [id number]   Admission Date 3/15/2025 Operation Date 3/16/2025   Attending Physician Luz Elena Sullivan MD Operating Physician Luz Elena Sullivan MD     PREOPERATIVE DIAGNOSIS  Acute perforated appendicitis    POSTOPERATIVE DIAGNOSIS  Acute gangrenous appendicitis with purulent peritonitis    PROCEDURE PERFORMED  Laparoscopic appendectomy  Transversus abdominis plane block  Drain placement    SURGEON  Luz Elena Sullivan MD    ASSISTANTS  None    ANESTHESIA  General    FINDINGS   Gangrenous appendicitis without any evidence of perforation, purulence throughout the abdomen, soft cecum allowing for appendectomy    INDICATIONS  25 year old female presented to hospital with 1 day history of abdominal pain. CT imaging revealed dilated appendix consistent with acute appendicitis. Patient had elevated leukocytosis and localized peritonitis on exam. Surgery was indicated.    DESCRIPTION OF PROCEDURE  After informed consent, patient was brought to the operating room and placed in supine position. Bilateral SCDs were placed and pre-operative antibiotics administered. Anesthesia was induced without any complication.  Bagley catheter was placed.  Patient was prepped and draped in the usual sterile fashion. Time out was performed confirming patient, procedure, and site.    The Veress needle was used to gain pneumoperitoneum. Using blade, a curvilinear supraumbilical incision was made. Veress needle was inserted just under the umbilical stalk with audible clicks.  On aspiration, there was no bowel contents.  Saline flowed easily confirming presence in the abdomen.  Pneumoperitoneum was created with CO2. An 12mm port was inserted. Upon entrance, no injury was noted to omentum or bowel at site of entrance.  Two 5mm ports were placed under direct visualization; the first left lower quadrant and the second in the suprapubic, triangulating toward the location of the appendix.  Transversus abdominis plane block was performed.    Patient did have some purulent material throughout the abdomen and in the pelvis.  A sample was sent for culture as peritoneal fluid.  Using graspers, bowel was retracted and the appendix identified.  Appendix was necrotic appearing.  It had twisted on the mesoappendix.  The ligament of Treves was draped circumferentially around the base of the appendix.  Due to the inflammation, everything was stuck right in the right lower quadrant.  At this time, I decided to mobilize cecum from the lateral attachments to better identify the anatomy.  This was done with LigaSure.  The ligament of Treves and all the attachments to the retroperitoneum were divided to reveal the base of the appendix.  Eventually, I was able to mobilize the cecum and see the base of the appendix which was soft, allowing for appendectomy.  Using endo KATE stapler with tan load, the appendix was divided. Staple line at cecum and mesoappendix were both hemostatic after.  There was a small serosal tear right at the staple line on the cecum.  This was covered with a piece of the ligament of Treves and anchored with clip applier.  Abdomen was irrigated. Appendix was placed in endocatch bag and removed from abdomen for pathology.  A 19 [place] Wally drain was placed in the pelvis and came out of the suprapubic incision.  Drain was anchored to the skin with 2-0 nylon.  Using Sai Patel, fascia at umbilicus was closed with o vicryl. Pneumoperitoneum was evacuated. Skin incisions were closed with 4-o monocryl. Incisions were washed and dressed with dermabond.    At the end of the case, all counts were correct. Patient tolerated procedure well. Patient was awakened and transferred to PACU in a hemodynamically stable condition.    SPECIMENS REMOVED  Appendix    ESTIMATED BLOOD LOSS  15 ml    COMPLICATIONS  None     Luz Elena Sullivan MD

## 2025-03-16 NOTE — ANESTHESIA POSTPROCEDURE EVALUATION
[facility]    Demi Mullins Patient Status:  Inpatient   Age/Gender 25 year old female MRN [id number]   Location [facility] POST ANESTHESIA CARE UNIT Attending Luz Elena Sullivan MD   Hosp Day # 0 PCP Steve Calix MD       Anesthesia Post-op Note    LAPAROSCOPIC APPENDECTOMY    Procedure Summary       Date: 03/16/25 Room / Location:  MAIN OR 08 / [facility] MAIN OR    Anesthesia Start: 0755 Anesthesia Stop: 1009    Procedure: LAPAROSCOPIC APPENDECTOMY (Abdomen) Diagnosis:       Appendicitis      (Appendicitis [K37])    Surgeons: Luz Elena Sullivan MD Anesthesiologist: Wally Jamison MD    Anesthesia Type: general ASA Status: 1 - Emergent            Anesthesia Type: general    Vitals Value Taken Time   /54 03/16/25 1006   Temp 99.1 °F (37.3 °C) 03/16/25 1001   Pulse 95 03/16/25 1008   Resp 13 03/16/25 1008   SpO2 94 % 03/16/25 1008   Vitals shown include unfiled device data.        Patient Location: PACU    Anesthesia Type: general    Airway Patency: patent    Postop Pain Control: adequate    Mental Status: mildly sedated but able to meaningfully participate in the post-anesthesia evaluation    Nausea/Vomiting: none    Cardiopulmonary/Hydration status: stable euvolemic    Complications: no apparent anesthesia related complications    Postop vital signs: stable    Dental Exam: Unchanged from Preop    Patient to be discharged from PACU when criteria met.

## 2025-03-16 NOTE — H&P
[facility]  Report of Surgical History and Physical Exam    Demi Mullins Patient Status:  Inpatient    2000 MRN [id number]   Location [facility] PRE OP HOLDING Attending Luz lEena Sullivan MD   Hosp Day # 0 PCP Steve Calix MD     Chief Complaint: Abdominal pain    History of Present Illness:  Demi Mullins is a a(n) 25 year old female who presented to [place] emergency room with 3-day history of abdominal pain.  Patient reports associated nausea and vomiting.  In the emergency room, she was found to be tachycardic but otherwise hemodynamically stable.  She had a leukocytosis to 14.  CT imaging was performed which showed a ruptured appendix with free fluid and a dilated inflamed appendix.  Patient was transferred to [facility].  General surgery was called for further evaluation and management.  On interview today, patient reports continued abdominal pain.  She denies any nausea or vomiting.  She denies any abdominal surgeries.      History:  Past Medical History:    PCOS (polycystic ovarian syndrome)    Seasonal allergies       History reviewed. No pertinent surgical history.    Family History   Problem Relation Age of Onset    Other (thyroid) Mother         thyroid    Cancer Maternal Grandmother         ovarian cancer    Hypertension Maternal Grandfather     Diabetes Maternal Grandfather     Stroke Other     Hypertension Other     Diabetes Other         reports that she has never smoked. She has never used smokeless tobacco. She reports current alcohol use. She reports that she does not currently use drugs.      Allergies:  Allergies[1]      Medications:    Current Facility-Administered Medications:     sodium chloride 0.9% infusion, , Intravenous, Continuous    [Transfer Hold] heparin (Porcine) 5000 UNIT/ML injection 5,000 Units, 5,000 Units, Subcutaneous, 2 times per day    [Transfer Hold] acetaminophen (Tylenol Extra Strength) tab 1,000 mg, 1,000 mg, Oral, Q8H SHILPI    [Transfer Hold]  ketorolac (Toradol) 30 MG/ML injection 30 mg, 30 mg, Intravenous, Q6H    [Transfer Hold] oxyCODONE immediate release tab 5 mg, 5 mg, Oral, Q4H PRN **OR** [Transfer Hold] oxyCODONE immediate release tab 10 mg, 10 mg, Oral, Q4H PRN    [Transfer Hold] HYDROmorphone (Dilaudid) 1 MG/ML injection 0.4 mg, 0.4 mg, Intravenous, Q2H PRN **OR** [Transfer Hold] HYDROmorphone (Dilaudid) 1 MG/ML injection 0.8 mg, 0.8 mg, Intravenous, Q2H PRN    [Transfer Hold] ondansetron (Zofran) 4 MG/2ML injection 4 mg, 4 mg, Intravenous, Q6H PRN    [Transfer Hold] prochlorperazine (Compazine) 10 MG/2ML injection 5 mg, 5 mg, Intravenous, Q8H PRN    piperacillin-tazobactam (Zosyn) 3.375 g in dextrose 5% 100 mL IVPB-ADDV, 3.375 g, Intravenous, Q8H      Review of Systems:  The review of systems was negative other than the above listed HPI and past medical history including the HEENT, Heart, Lungs, GI, , Neuro, Musculoskeletal, Hematologic, Endocrine and Psych.       Physical Exam:  Blood pressure 98/48, pulse 89, temperature 97.9 °F (36.6 °C), temperature source Oral, resp. rate 18, height 60\", weight 113 lb (51.3 kg), last menstrual period 01/02/2025, SpO2 98%.  General: Alert, orientated x3.  Cooperative.  No apparent distress.  HEENT: Exam is unremarkable.  Without scleral icterus.  Mucous membranes are moist. EOM are WNL.  Neck: No tenderness to palpitation.  Full range of motion to flexion and extension, lateral rotation and lateral flexion of cervical spine.  No JVD. Supple.   Lungs: Bilateral chest rise. Good excursion of the diaphragms. No secondary use of accessory respiratory musculature.  Cardiac: Regular rate and rhythm. No murmur.  Abdomen: Soft, tender to palpation in right lower quadrant, nondistended  Extremities:  No lower extremity edema noted.  Without clubbing or cyanosis.  2+ pulses x4  Skin: Normal texture and turgor.      Laboratory Data and Relevant Imaging:  Recent Labs   Lab 03/15/25  2052   RBC 3.90   HGB 12.5   HCT  36.4   MCV 93.3   MCH 32.1   MCHC 34.3   RDW 13.1   NEPRELIM 12.05*   WBC 14.4*   .0       Recent Labs   Lab 03/15/25  2052   *   BUN 12   CREATSERUM 0.74   CA 9.8   ALB 4.9*   *   K 3.8      CO2 25.0   ALKPHO 84   AST 20   ALT 18   BILT 1.0   TP 8.1         No results for input(s): \"PTP\", \"INR\", \"PTT\" in the last 168 hours.    Imaging  CT ABDOMEN+PELVIS(CONTRAST ONLY)(CPT=74177)    Result Date: 3/15/2025  CONCLUSION:   1. Findings concerning for ruptured acute appendicitis.  There are multiple appendicoliths noted along the course of the appendix measuring up to 6 mm.  There are moderate to severe inflammatory changes surrounding the appendix extending into the right lower quadrant.  The appendix has a reference diameter of approximately 12 mm.  There is secondary inflammation of the surrounding small bowel loops.  There is a small amount of free fluid tracking into the pelvis.  No free air or abscess formation.  Clinical correlation recommended.  2. Findings concerning for cystitis.  Clinical correlation recommended.  3. Diffuse fatty infiltration of the liver.   Critical results were discussed with Dr. Bishop at 2137 hours on 3/15/2025. Critical results were read back.  LOCATION:  [place]   Dictated by (CST): Jaron Ortiz MD on 3/15/2025 at 9:34 PM     Finalized by (CST): Jaron Ortiz MD on 3/15/2025 at 9:38 PM            Impression/Plan:  Patient Active Problem List   Diagnosis    Acute appendicitis with rupture       25 year old female with acute perforated appendicitis without abscess    CT images were reviewed personally by me  CT shows a dilated and inflamed appendix with some surrounding free fluid but there is no collection consistent with an abscess  On physical exam, patient continues to be tender to palpation  I recommend proceeding to the operating room for appendectomy  We will plan for laparoscopic appendectomy, possible open  Procedure explained to the patient  Risks  including bleeding, pain, infection, abscess formation, postoperative ileus, colon resection, and need for further intervention all discussed  Due to the patient possibly being perforated, she may have a drain placed  Postoperatively, patient cannot lift anything heavier than 15 pounds for 4 weeks    Based on intraoperative findings, patient may be admitted to the hospital for IV antibiotics    Thank you for letting me participate in the care of this patient    Luz Elena Sullivan MD  3/16/2025  7:44 AM         [1] No Known Allergies

## 2025-03-16 NOTE — ED INITIAL ASSESSMENT (HPI)
RLQ abd pain since Thursday night. States started after eating 2 apples. Did have vomiting Thursday night but no vomiting since. States has also been constipated and has burning with urination.  Went to urgent care today, had blood work and a urine sample and was ordered outpatient ct ordered but did not have it done.

## 2025-03-17 LAB
ANION GAP SERPL CALC-SCNC: 7 MMOL/L (ref 0–18)
BASOPHILS # BLD AUTO: 0.02 X10(3) UL (ref 0–0.2)
BASOPHILS NFR BLD AUTO: 0.2 %
BUN BLD-MCNC: 9 MG/DL (ref 9–23)
CALCIUM BLD-MCNC: 9.1 MG/DL (ref 8.7–10.6)
CHLORIDE SERPL-SCNC: 104 MMOL/L (ref 98–112)
CO2 SERPL-SCNC: 30 MMOL/L (ref 21–32)
CREAT BLD-MCNC: 0.69 MG/DL
EGFRCR SERPLBLD CKD-EPI 2021: 124 ML/MIN/1.73M2 (ref 60–?)
EOSINOPHIL # BLD AUTO: 0.01 X10(3) UL (ref 0–0.7)
EOSINOPHIL NFR BLD AUTO: 0.1 %
ERYTHROCYTE [DISTWIDTH] IN BLOOD BY AUTOMATED COUNT: 12.8 %
GLUCOSE BLD-MCNC: 95 MG/DL (ref 70–99)
HCT VFR BLD AUTO: 32.9 %
HGB BLD-MCNC: 11 G/DL
IMM GRANULOCYTES # BLD AUTO: 0.03 X10(3) UL (ref 0–1)
IMM GRANULOCYTES NFR BLD: 0.3 %
LYMPHOCYTES # BLD AUTO: 1.5 X10(3) UL (ref 1–4)
LYMPHOCYTES NFR BLD AUTO: 15.3 %
MAGNESIUM SERPL-MCNC: 2.1 MG/DL (ref 1.6–2.6)
MCH RBC QN AUTO: 31.3 PG (ref 26–34)
MCHC RBC AUTO-ENTMCNC: 33.4 G/DL (ref 31–37)
MCV RBC AUTO: 93.5 FL
MONOCYTES # BLD AUTO: 0.6 X10(3) UL (ref 0.1–1)
MONOCYTES NFR BLD AUTO: 6.1 %
NEUTROPHILS # BLD AUTO: 7.67 X10 (3) UL (ref 1.5–7.7)
NEUTROPHILS # BLD AUTO: 7.67 X10(3) UL (ref 1.5–7.7)
NEUTROPHILS NFR BLD AUTO: 78 %
OSMOLALITY SERPL CALC.SUM OF ELEC: 290 MOSM/KG (ref 275–295)
PLATELET # BLD AUTO: 220 10(3)UL (ref 150–450)
POTASSIUM SERPL-SCNC: 3.7 MMOL/L (ref 3.5–5.1)
RBC # BLD AUTO: 3.52 X10(6)UL
SODIUM SERPL-SCNC: 141 MMOL/L (ref 136–145)
WBC # BLD AUTO: 9.8 X10(3) UL (ref 4–11)

## 2025-03-17 RX ORDER — SODIUM CHLORIDE 9 MG/ML
INJECTION, SOLUTION INTRAVENOUS CONTINUOUS
Status: DISCONTINUED | OUTPATIENT
Start: 2025-03-17 | End: 2025-03-18

## 2025-03-17 NOTE — PAYOR COMM NOTE
--------------  ADMISSION REVIEW   3/16-3/17  Payor: [employer] [facility] [employer]  Subscriber #:  [id number]  Authorization Number: [id number]    Admit date: 3/16/25  Admit time: 12:45 AM       REVIEW DOCUMENTATION:  ED Provider Notes signed by Armond Bishop MD at 3/15/2025 10:05 PM      History     Chief Complaint   Patient presents with    Abdomen/Flank Pain     25 year old female presents with abdominal pain.  Thursday patient states she had a couple apples and afterwards felt a lot of bloating, abdominal discomfort, emesis.  Since then has had difficulty with oral intake due to poor appetite, shortly had some crackers today.  No further vomiting or diarrhea.  She has dysuria since yesterday.  She feels dehydrated.  Went to an urgent care and was advised to come to the ER.  She does have a history of PCOS but does not have the symptoms due to the condition previously.  LMP January, typically irregular for patient.    Past Medical History:    PCOS (polycystic ovarian syndrome)    Seasonal allergies     Physical Exam     ED Triage Vitals [03/15/25 2025]   /63   Pulse 117   Resp 18   Temp 98.5 °F (36.9 °C)   Temp src Oral   SpO2 96 %   O2 Device None (Room air)       Physical Exam  Constitutional:       General: She is in acute distress.   Eyes:      Extraocular Movements: Extraocular movements intact.   Cardiovascular:      Rate and Rhythm: Normal rate.      Pulses: Normal pulses.   Pulmonary:      Effort: Pulmonary effort is normal. No respiratory distress.   Abdominal:      General: There is distension.      Palpations: Abdomen is soft.      Tenderness: There is abdominal tenderness in the right lower quadrant. There is no guarding or rebound.   Musculoskeletal:      Cervical back: Normal range of motion.   Neurological:      General: No focal deficit present.      Mental Status: She is alert.        ED Course     Labs Reviewed   URINALYSIS, ROUTINE - Abnormal; Notable for the following components:        Result Value    Clarity Urine Cloudy (*)     Ketones Urine >=160 (*)     Blood Urine Small (*)     Protein Urine 30 mg/dL (*)     Urobilinogen Urine 4.0 (*)     Leukocyte Esterase Urine Trace (*)     All other components within normal limits   UA MICROSCOPIC ONLY, URINE - Abnormal; Notable for the following components:    Bacteria Urine 1+ (*)     Squamous Epi. Cells Many (*)     All other components within normal limits   CBC WITH DIFFERENTIAL WITH PLATELET - Abnormal; Notable for the following components:    WBC 14.4 (*)     Neutrophil Absolute Prelim 12.05 (*)     Neutrophil Absolute 12.05 (*)     All other components within normal limits   COMP METABOLIC PANEL (14) - Abnormal; Notable for the following components:    Glucose 116 (*)     Sodium 134 (*)     Albumin 4.9 (*)     All other components within normal limits   ICTOTEST - Normal   LIPASE - Normal   POCT PREGNANCY URINE - Normal   SCAN SLIDE     CT ABDOMEN+PELVIS(CONTRAST ONLY)(CPT=74177)    Result Date: 3/15/2025  CONCLUSION:   1. Findings concerning for ruptured acute appendicitis.  There are multiple appendicoliths noted along the course of the appendix measuring up to 6 mm.  There are moderate to severe inflammatory changes surrounding the appendix extending into the right lower quadrant.  The appendix has a reference diameter of approximately 12 mm.  There is secondary inflammation of the surrounding small bowel loops.  There is a small amount of free fluid tracking into the pelvis.  No free air or abscess formation.  Clinical correlation recommended.  2. Findings concerning for cystitis.  Clinical correlation recommended.  3. Diffuse fatty infiltration of the liver.   Critical results were discussed with Dr. Bishop at 2137 hours on 3/15/2025. Critical results were read back.  LOCATION:  [place]   Dictated by (CST): Jaron Ortiz MD on 3/15/2025 at 9:34 PM     Finalized by (CST): Jaron Ortiz MD on 3/15/2025 at 9:38 PM            MDM     Vitals:     03/15/25 2025 03/15/25 2159   BP: 111/63 114/59   Pulse: 117 97   Resp: 18 18   Temp: 98.5 °F (36.9 °C)    TempSrc: Oral    SpO2: 96% 100%   Weight: 51.3 kg    Height: 152.4 cm (5')        Colitis, gastroenteritis, appendicitis, cystitis on differential.  Uncomfortable but nontoxic-appearing.  Labs, urine, CT.    ED Course as of 03/15/25 2205  ------------------------------------------------------------  Time: 03/15 2121  Comment: Urinalysis consistent with dehydration, leukocytosis noted.  Labs otherwise with reassuring renal function and LFTs, lipase normal.  ------------------------------------------------------------  Time: 03/15 2144  Comment: My interpretation of CT with inflammatory changes of the right lower quadrant.  Radiologist is noting what appears to be ruptured appendicitis.  Free fluid, associated inflammatory changes.  Will start on antibiotics and consult surgery, patient will be admitted for further care         Disposition and Plan     Clinical Impression:  1. Acute appendicitis with rupture    Disposition:  Admit    Hospital Problems       Present on Admission  Date Reviewed: 3/17/2023            ICD-10-CM Noted POA    * (Principal) Acute appendicitis with rupture K35.32 3/15/2025 Unknown                3/16 H&P    Chief Complaint: Abdominal pain     History of Present Illness:  Demi Mullins is a a(n) 25 year old female who presented to [place] emergency room with 3-day history of abdominal pain.  Patient reports associated nausea and vomiting.  In the emergency room, she was found to be tachycardic but otherwise hemodynamically stable.  She had a leukocytosis to 14.  CT imaging was performed which showed a ruptured appendix with free fluid and a dilated inflamed appendix.  Patient was transferred to [facility].  General surgery was called for further evaluation and management.  On interview today, patient reports continued abdominal pain.  She denies any nausea or vomiting.  She  denies any abdominal surgeries.    CT ABDOMEN+PELVIS(CONTRAST ONLY)(CPT=74177)     Result Date: 3/15/2025  CONCLUSION:   1. Findings concerning for ruptured acute appendicitis.  There are multiple appendicoliths noted along the course of the appendix measuring up to 6 mm.  There are moderate to severe inflammatory changes surrounding the appendix extending into the right lower quadrant.  The appendix has a reference diameter of approximately 12 mm.  There is secondary inflammation of the surrounding small bowel loops.  There is a small amount of free fluid tracking into the pelvis.  No free air or abscess formation.  Clinical correlation recommended.  2. Findings concerning for cystitis.  Clinical correlation recommended.  3. Diffuse fatty infiltration of the liver.   Critical results were discussed with Dr. Bishop at 2137 hours on 3/15/2025. Critical results were read back.  LOCATION:  [place]   Dictated by (CST): Jaron Ortiz MD on 3/15/2025 at 9:34 PM     Finalized by (CST): Jaron Ortiz MD on 3/15/2025 at 9:38 PM               Impression/Plan:      Patient Active Problem List   Diagnosis    Acute appendicitis with rupture         25 year old female with acute perforated appendicitis without abscess     CT images were reviewed personally by me  CT shows a dilated and inflamed appendix with some surrounding free fluid but there is no collection consistent with an abscess  On physical exam, patient continues to be tender to palpation  I recommend proceeding to the operating room for appendectomy  We will plan for laparoscopic appendectomy, possible open  Procedure explained to the patient  Risks including bleeding, pain, infection, abscess formation, postoperative ileus, colon resection, and need for further intervention all discussed  Due to the patient possibly being perforated, she may have a drain placed  Postoperatively, patient cannot lift anything heavier than 15 pounds for 4 weeks     Based on  intraoperative findings, patient may be admitted to the hospital for IV antibiotics            3/16    PREOPERATIVE DIAGNOSIS  Acute perforated appendicitis     POSTOPERATIVE DIAGNOSIS  Acute gangrenous appendicitis with purulent peritonitis     PROCEDURE PERFORMED  Laparoscopic appendectomy  Transversus abdominis plane block  Drain placement      FINDINGS              Gangrenous appendicitis without any evidence of perforation, purulence throughout the abdomen, soft cecum allowing for appendectomy            3/17 Surgery    PCP Steve Calix MD      Subjective:  The patient is resting comfortably in the bed today.  She denies nausea or vomiting.  She is tolerating clear liquids well.  She states that she has not ambulated yet.  She denies fever or chills.     Objective/Physical Exam:  General: Alert, orientated x3.  Cooperative.  No apparent distress.  Vital Signs:  Blood pressure 94/55, pulse 63, temperature 97.8 °F (36.6 °C), temperature source Oral, resp. rate 18, height 60\", weight 113 lb (51.3 kg), last menstrual period 01/02/2025, SpO2 98%.    .   Abdomen:  Soft, mildly distended, non tender, with no rebound or     Drain: With serosanguineous output        ketorolac  30 mg Intravenous Q6H    piperacillin-tazobactam  3.375 g Intravenous Q8H            Labs:        Lab Results   Component Value Date     WBC 9.8 03/17/2025     HGB 11.0 03/17/2025     HCT 32.9 03/17/2025     .0 03/17/2025            Lab Results   Component Value Date      03/17/2025     K 3.7 03/17/2025      03/17/2025     CO2 30.0 03/17/2025     BUN 9 03/17/2025     CREATSERUM 0.69 03/17/2025     GLU 95 03/17/2025     CA 9.1 03/17/2025      No results found for: \"PT\", \"INR\"        Assessment      Patient Active Problem List   Diagnosis    Acute appendicitis with rupture         Postop day 1 laparoscopic appendectomy     Plan:  She may start full liquid diet.  Further advancement of diet with return of bowel function.  Continue  IV antibiotics.  Continue IV fluids.  Continue multimodal pain control.  Continue drain care.  Ambulate and up to chair  DVT prophylaxis with heparin  Anticipate discharge to home tomorrow       MEDICATIONS ADMINISTERED IN LAST 1 DAY:  acetaminophen (Tylenol Extra Strength) tab 1,000 mg       Date Action Dose Route User    3/17/2025 0525 Given 1,000 mg Oral Luís Spence RN    3/16/2025 1533 Given 1,000 mg Oral Brenda Nair RN          heparin (Porcine) 5000 UNIT/ML injection 5,000 Units       Date Action Dose Route User    3/17/2025 0754 Given 5,000 Units Subcutaneous (Left Lower Abdomen) Brenda Nair RN    3/16/2025 2120 Given 5,000 Units Subcutaneous (Right Lower Abdomen) Luís Spence RN          ketorolac (Toradol) 30 MG/ML injection 30 mg       Date Action Dose Route User    3/17/2025 0530 Given 30 mg Intravenous Luís Spence RN    3/17/2025 0030 Given 30 mg Intravenous Luís Spence RN    3/16/2025 1813 Given 30 mg Intravenous Brenda Nair RN    3/16/2025 1121 Given 30 mg Intravenous Brenda Nair RN          oxyCODONE immediate release tab 5 mg       Date Action Dose Route User    3/16/2025 2102 Given 5 mg Oral Luís Spence RN          piperacillin-tazobactam (Zosyn) 3.375 g in dextrose 5% 100 mL IVPB-ADDV       Date Action Dose Route User    3/17/2025 0754 New Bag 3.375 g Intravenous Brenda Nair RN    3/17/2025 0032 New Bag 3.375 g Intravenous Luís Spence RN    3/16/2025 1533 New Bag 3.375 g Intravenous Brenda Nair RN          sodium chloride 0.9% infusion       Date Action Dose Route User    3/17/2025 0745 New Bag (none) Intravenous Brenda Nair RN            Vitals (last day)       Date/Time Temp Pulse Resp BP SpO2 Weight O2 Device O2 Flow Rate (L/min) [facility]    03/17/25 1057 98.4 °F (36.9 °C) 76 16 96/52 97 % -- None (Room air) -- SK    03/17/25 0745 97.8 °F (36.6 °C) 63 18 94/55 98 % -- None (Room air) -- NS    03/17/25 0524 -- -- -- -- -- 113 lb (51.3 kg) -- -- EM     03/17/25 0441 97.9 °F (36.6 °C) 77 14 93/50 99 % -- None (Room air) -- RR    03/16/25 2330 98.6 °F (37 °C) 70 16 94/50 96 % -- None (Room air) -- RR    03/16/25 2030 97.9 °F (36.6 °C) 68 16 99/62 97 % -- None (Room air) -- RR    03/16/25 1500 98.9 °F (37.2 °C) 75 18 98/53 96 % -- None (Room air) -- NS    03/16/25 1128 98.1 °F (36.7 °C) 86 16 111/59 94 % -- None (Room air) -- NS    03/16/25 1110 -- 88 15 -- 93 % -- -- -- MB    03/16/25 1105 -- 84 16 -- 93 % -- -- -- MB    03/16/25 1100 -- 82 16 105/57 99 % -- -- -- MB    03/16/25 1055 -- 82 15 -- 99 % -- -- -- MB    03/16/25 1050 -- 87 16 -- 99 % -- -- -- MB    03/16/25 1045 -- 86 17 105/59 99 % -- -- -- MB    03/16/25 1040 -- 90 20 -- 98 % -- -- -- MB    03/16/25 1035 -- 89 17 -- 99 % -- None (Room air) -- MB    03/16/25 1030 97.5 °F (36.4 °C) 89 16 104/60 98 % -- -- -- MB    03/16/25 1025 -- 86 16 -- 99 % -- -- -- MB    03/16/25 1020 -- 89 17 -- 99 % -- -- -- MB    03/16/25 1015 -- 93 18 103/58 97 % -- -- -- MB    03/16/25 1010 -- 98 12 100/54 95 % -- Nasal cannula 3 L/min MB    03/16/25 1005 -- 98 17 102/53 90 % -- -- -- MB    03/16/25 1001 99.1 °F (37.3 °C) 115 17 102/53 91 % -- None (Room air) -- MB    03/16/25 0500 97.9 °F (36.6 °C) 89 18 98/48 98 % -- None (Room air) 0 L/min AF    03/16/25 0035 -- -- 16 109/64 100 % -- None (Room air) 0 L/min AF          CIWA Scores (since admission)       None

## 2025-03-17 NOTE — PLAN OF CARE
Pt A&Ox4. VSS on RA. . Lap sites x2 intact with skin glue. Dressing to LEENA drain site clean and intact. Denies nausea. Pain managed with scheduled tylenol and Toradol. Dtv. Call light within reach. Will continue to monitor.

## 2025-03-17 NOTE — DISCHARGE INSTRUCTIONS
Appendectomy  Dr. Luz Elena Sullivan    MEDICATIONS  For post-operative pain control, the medications are usually oxycodone.  This is a narcotic and is best taken by starting with a half a tablet every four to six hours as needed.  If the patient does not feel they need the narcotics they shouldn’t take them.  If the pain is severe the patient may take a whole pill every six hours.  The patient can take tylenol 1g three times a day and ibuprofen 400-600mg in between up to 4 times a day as needed for pain as well.  The patient can take zofran 4mg every 6 hours as needed for nausea. The patient can also take miralax or colace as needed for constipation. Please ask your surgeon before resuming blood thinners such as aspirin, Plavix or Coumadin.  All other home medications may be resumed as scheduled.  With severe appendicitis, antibiotics will also be prescribed.  With antibiotics, please watch for rash, facial swelling or severe diarrhea.    DIET  The patient may resume a general diet immediately.  This is not a good time to eat excessively.  The patient should eat in moderation and stick with foods the patient feels are easy to digest. There should be no alcohol consumption in the immediate recovery time period or within six hours of taking narcotics.    WOUND CARE  The dressing is usually a dissolvable glue which protects the wound. The patient can take a shower starting the day after surgery, but please, no baths or soaking. Please do not put any creams or ointments on the surgical incisions. If the patient does have a top dressing with clear tape and gauze, this dressing may be removed in 2 days. Do not remove the steri-strips or butterfly tapes that are white and adherent to the skin.  The steri-strips will eventually peel up at the ends and at this point they may be removed.  This is usually seven to ten days after surgery.  When showering, soap can get on the wounds but do not scrub over the wounds.  No hair dye  or chemicals of any kind should get in the wounds.  Avoid tub baths, swimming or sitting in a hot tub for two weeks.  If visible sutures or staples are present they will be removed in the office by the surgeon or nurse.  Most wounds will be closed with dissolving suture underneath the skin.  These sutures will dissolve on their own.  If a drain is present make sure the patient receives drain care instructions from the nurse prior to discharge.  Most patients will not have a drain.    ACTIVITY  Every day the patient should be up, showered and dressed.  Each day the patient should be up and around the house.  The patient should not lie in bed and should not stay in pajamas.  We count on the patient being up, coughing, walking and deep breathing to avoid pneumonia and blood clots in the legs.  Once a day the patient should get out of the house and go shopping, go to the mall, the [employer] store, the [employer] or a restaurant.  The patient may ride in a car but should not drive the car for at least one week.  Patients should be off narcotics for at least 8 hours prior to being a .  The average time off work is 10 to 14 days; most adults will be seeing the surgeon prior to returning to work.  Students will return to school within 1-5 days after discharge from the hospital but will be off gym, sports, and indoors for recess for one month.  Patients may go up and down stairs and lift up to five pounds but no bending, pushing or pulling.  Nothing called work or exercise until the follow up visit.  No ‘stair-master’, power walking, jogging or workouts until the follow up visit.  Patients should seek further activity limits at the time of their appointment.    APPOINTMENT  Please call our office for an appointment within five to ten days of discharge.  Any fever greater than 100.5, chills, nausea, vomiting, or severe diarrhea please call our office.  If the wound turns red, hot, swollen, becomes increasingly painful, or  drains pus call us immediately at [phone].  For life threatening emergencies call 911.  For non-emergent care please call our office after 8:30 a.m. Monday through Friday.  The number listed above is our office number; our phone automatically switches to our answering service if we are not there.  Please do not use the emergency room for non-urgent care.    Thank you for entrusting us with your care.  [facility]--General Surgery

## 2025-03-17 NOTE — PLAN OF CARE
Pt A&Ox4. VSS on RA. . IVF infusing as ordered. Lap sites x2 intact with skin glue. LEENA drain intact with serosanguinous output, dressing clean and dry. Denies nausea. Pain managed with scheduled tylenol and Toradol. Call light within reach. Will continue to monitor.

## 2025-03-17 NOTE — PROGRESS NOTES
[facility]  Progress Note    Demi Mullins Patient Status:  Inpatient    2000 MRN [id number]   Location [facility] 3SW-A Attending Luz Elena Sullivan MD   Hosp Day # 1 PCP Steve Calix MD     Subjective:  The patient is resting comfortably in the bed today.  She denies nausea or vomiting.  She is tolerating clear liquids well.  She states that she has not ambulated yet.  She denies fever or chills.    Objective/Physical Exam:  General: Alert, orientated x3.  Cooperative.  No apparent distress.  Vital Signs:  Blood pressure 94/55, pulse 63, temperature 97.8 °F (36.6 °C), temperature source Oral, resp. rate 18, height 60\", weight 113 lb (51.3 kg), last menstrual period 2025, SpO2 98%.  Wt Readings from Last 3 Encounters:   25 113 lb (51.3 kg)   23 (P) 150 lb 0.4 oz (68.1 kg)   23 146 lb (66.2 kg)     Lungs: No respiratory distress.  Cardiac: Regular rate and rhythm.   Abdomen:  Soft, mildly distended, non tender, with no rebound or guarding.  No peritoneal signs.   Extremities:  No lower extremity edema noted.    Incision: Clean, dry, intact, no erythema  Drain: With serosanguineous output    Intake/Output:    Intake/Output Summary (Last 24 hours) at 3/17/2025 1056  Last data filed at 3/17/2025 0525  Gross per 24 hour   Intake 220 ml   Output 1285 ml   Net -1065 ml     I/O last 3 completed shifts:  In: 1420 [P.O.:220; I.V.:1000; IV PIGGYBACK:200]  Out: 1530 [Urine:1400; Drains:115; Blood:15]  No intake/output data recorded.    Medications:    heparin  5,000 Units Subcutaneous 2 times per day    acetaminophen  1,000 mg Oral Q8H SHILPI    ketorolac  30 mg Intravenous Q6H    piperacillin-tazobactam  3.375 g Intravenous Q8H       Labs:  Lab Results   Component Value Date    WBC 9.8 2025    HGB 11.0 2025    HCT 32.9 2025    .0 2025     Lab Results   Component Value Date     2025    K 3.7 2025     2025    CO2 30.0 2025     BUN 9 03/17/2025    CREATSERUM 0.69 03/17/2025    GLU 95 03/17/2025    CA 9.1 03/17/2025     No results found for: \"PT\", \"INR\"      Assessment  Patient Active Problem List   Diagnosis    Acute appendicitis with rupture       Postop day 1 laparoscopic appendectomy    Plan:  She may start full liquid diet.  Further advancement of diet with return of bowel function.  Continue IV antibiotics.  Continue IV fluids.  Continue multimodal pain control.  Continue drain care.  Ambulate and up to chair  DVT prophylaxis with heparin  Anticipate discharge to home tomorrow patient continues to do well.    Quality:  DVT Mechanical Prophylaxis:   SCDs, Early ambuation  DVT Pharmacologic Prophylaxis   Medication    heparin (Porcine) 5000 UNIT/ML injection 5,000 Units                Code Status: Not on file  Bagley: No urinary catheter in place  Bagley Duration (in days):   Central line:    HOANG: 3/18/2025        Holli Drake PA-C  3/17/2025  10:56 AM      This note was initiated by Holli Drake.  The PA saw the patient in conjunction with me. The PA performed a history, exam, and developed the assessment and plan in conjunction with me. I agree with the above note and have made changes which reflect my own history and physical, if necessary.    Luz Elena Sullivan MD  [facility]  3/17/2025  4:05 PM

## 2025-03-17 NOTE — PLAN OF CARE
Alert and oriented x 4. VSS; on room air. Pain controlled with PRN medication. Up standby with a walker. Voiding without difficulty. Patient on clears, still needs a bowel movement. Drain in place, measuring output. On IV abx. POC discussed with patient. Safety precautions in place. Call light within reach.

## 2025-03-18 VITALS
OXYGEN SATURATION: 97 % | SYSTOLIC BLOOD PRESSURE: 94 MMHG | WEIGHT: 113 LBS | TEMPERATURE: 99 F | RESPIRATION RATE: 18 BRPM | BODY MASS INDEX: 22.19 KG/M2 | DIASTOLIC BLOOD PRESSURE: 56 MMHG | HEART RATE: 68 BPM | HEIGHT: 60 IN

## 2025-03-18 LAB
ANION GAP SERPL CALC-SCNC: 7 MMOL/L (ref 0–18)
BASOPHILS # BLD AUTO: 0.01 X10(3) UL (ref 0–0.2)
BASOPHILS NFR BLD AUTO: 0.2 %
BUN BLD-MCNC: 6 MG/DL (ref 9–23)
CALCIUM BLD-MCNC: 8.5 MG/DL (ref 8.7–10.6)
CHLORIDE SERPL-SCNC: 109 MMOL/L (ref 98–112)
CO2 SERPL-SCNC: 26 MMOL/L (ref 21–32)
CREAT BLD-MCNC: 0.67 MG/DL
EGFRCR SERPLBLD CKD-EPI 2021: 125 ML/MIN/1.73M2 (ref 60–?)
EOSINOPHIL # BLD AUTO: 0.04 X10(3) UL (ref 0–0.7)
EOSINOPHIL NFR BLD AUTO: 0.7 %
ERYTHROCYTE [DISTWIDTH] IN BLOOD BY AUTOMATED COUNT: 12.8 %
GLUCOSE BLD-MCNC: 98 MG/DL (ref 70–99)
HCT VFR BLD AUTO: 31.6 %
HGB BLD-MCNC: 10.2 G/DL
IMM GRANULOCYTES # BLD AUTO: 0.03 X10(3) UL (ref 0–1)
IMM GRANULOCYTES NFR BLD: 0.5 %
LYMPHOCYTES # BLD AUTO: 1.96 X10(3) UL (ref 1–4)
LYMPHOCYTES NFR BLD AUTO: 33.3 %
MAGNESIUM SERPL-MCNC: 1.9 MG/DL (ref 1.6–2.6)
MCH RBC QN AUTO: 30.9 PG (ref 26–34)
MCHC RBC AUTO-ENTMCNC: 32.3 G/DL (ref 31–37)
MCV RBC AUTO: 95.8 FL
MONOCYTES # BLD AUTO: 0.46 X10(3) UL (ref 0.1–1)
MONOCYTES NFR BLD AUTO: 7.8 %
NEUTROPHILS # BLD AUTO: 3.39 X10 (3) UL (ref 1.5–7.7)
NEUTROPHILS # BLD AUTO: 3.39 X10(3) UL (ref 1.5–7.7)
NEUTROPHILS NFR BLD AUTO: 57.5 %
OSMOLALITY SERPL CALC.SUM OF ELEC: 292 MOSM/KG (ref 275–295)
PLATELET # BLD AUTO: 213 10(3)UL (ref 150–450)
POTASSIUM SERPL-SCNC: 3.9 MMOL/L (ref 3.5–5.1)
RBC # BLD AUTO: 3.3 X10(6)UL
SODIUM SERPL-SCNC: 142 MMOL/L (ref 136–145)
WBC # BLD AUTO: 5.9 X10(3) UL (ref 4–11)

## 2025-03-18 RX ORDER — OXYCODONE HYDROCHLORIDE 5 MG/1
5 TABLET ORAL EVERY 4 HOURS PRN
Qty: 15 TABLET | Refills: 0 | Status: SHIPPED | OUTPATIENT
Start: 2025-03-18 | End: 2025-03-24 | Stop reason: ALTCHOICE

## 2025-03-18 NOTE — PLAN OF CARE
Patient for discharge today. Discharge instructions reviewed followup instructions reviewed. Medications reviewed.

## 2025-03-18 NOTE — PROGRESS NOTES
[facility]  General Surgery Progress Note    Demi Mullins Patient Status:  Inpatient    2000 MRN [id number]   Location [facility] 3SW-A Attending Luz Elena Sullivan MD   Hosp Day # 2 PCP Steve Calix MD     Subjective:  No acute events overnight.  Patient continues to do well.  Drain with 20 cc out in past 24 hours.    Objective/Physical Exam:      Intake/Output Summary (Last 24 hours) at 3/18/2025 1041  Last data filed at 3/18/2025 0537  Gross per 24 hour   Intake 240 ml   Output 20 ml   Net 220 ml       Vital Signs:  Blood pressure 94/56, pulse 68, temperature 98.6 °F (37 °C), temperature source Oral, resp. rate 18, height 60\", weight 113 lb (51.3 kg), last menstrual period 2025, SpO2 97%.    General: Alert, orientated x3.  Cooperative.  No apparent distress.  HEENT: Exam is unremarkable.  Without scleral icterus.  Mucous membranes are moist. Oropharynx is clear.  Neck: No JVD. Supple.   Lungs: Non labored breathing, equal chest rise  Cardiac: Regular rate and rhythm. No murmur.  Abdomen:  Soft, non-distended, non-tender, with no rebound or guarding.  No peritoneal signs.  Drain in place with serosanguineous output  Extremities:  No lower extremity edema noted.  Without clubbing or cyanosis.  2+ pulses x4, motor and sensation grossly intact      Labs:  Lab Results   Component Value Date    WBC 5.9 2025    RBC 3.30 2025    HGB 10.2 2025    HCT 31.6 2025    MCV 95.8 2025    MCH 30.9 2025    MCHC 32.3 2025    RDW 12.8 2025    .0 2025     Lab Results   Component Value Date     2025    K 3.9 2025     2025    CO2 26.0 2025    BUN 6 2025    CREATSERUM 0.67 2025    GLU 98 2025    CA 8.5 2025     Lab Results   Component Value Date    MG 1.9 2025       Images:  No results found.    Assessment/Plan:  Patient Active Problem List   Diagnosis    Acute appendicitis with rupture        25 year old female with acute gangrenous appendicitis status post laparoscopic appendectomy    Patient is cleared for discharge  She will follow-up then 5 to 7 days for drain removal  5-day course of antibiotics  Patient can call the office for any questions or concerns    Luz Elena Sullivan MD  [facility] General Surgery  3/18/2025  10:41 AM

## 2025-03-18 NOTE — PLAN OF CARE
Alert and oriented x 4. VSS; on room air. Pain controlled with scheduled medication. Up standby. Voiding without difficulty in the bathroom. Patient on full liquid, still needs return of bowel function. Drain in place, measuring output. On IV abx. Possible discharge home today when cleared. POC discussed with patient. Safety precautions in place. Call light within reach.

## 2025-03-20 NOTE — DISCHARGE SUMMARY
[facility]  Discharge Summary    Demi Mullins Patient Status:  Inpatient    2000 MRN [id number]   Location [facility] 3SW-A Attending No att. providers found   Hosp Day # 2 PCP Steve Calix MD     Date of Admission: 3/15/2025    Date of Discharge: 3/18/2025    Admitting Diagnosis: Acute appendicitis with rupture [K35.32]       Patient Active Problem List   Diagnosis    Acute appendicitis with rupture       Reason for Admission:  Acute appendicitis with rupture [K35.32]     Procedures: LAPAROSCOPIC APPENDECTOMY    History of Present Illness: Acute appendicitis with rupture [K35.32]  Demi Mullins is a a(n) 25 year old female who presented to [place] emergency room with 3-day history of abdominal pain.  Patient reports associated nausea and vomiting.  In the emergency room, she was found to be tachycardic but otherwise hemodynamically stable.  She had a leukocytosis to 14.  CT imaging was performed which showed a ruptured appendix with free fluid and a dilated inflamed appendix.  Patient was transferred to [facility].  General surgery was called for further evaluation and management.  On interview today, patient reports continued abdominal pain.  She denies any nausea or vomiting.  She denies any abdominal surgeries.     She underwent laparoscopic appendectomy and was noted to have perforated appendix. A LEENA drain was left.     Hospital Course: The patient tolerated the above listed procedure without complication. Her pain was controlled, she tolerated diet and had return of bowel function. The patient is being discharged with the following physical exam.      Physical Exam:   General: Alert, orientated x3.  Cooperative.  No apparent distress.  HEENT: Exam is unremarkable.  Without scleral icterus.  Mucous membranes are moist. Oropharynx is clear.  Neck: No JVD. Supple.   Lungs: Non labored breathing, equal chest rise  Cardiac: Regular rate and rhythm. No murmur.  Abdomen:  Soft, non-distended,  non-tender, with no rebound or guarding.  No peritoneal signs.  Drain in place with serosanguineous output  Extremities:  No lower extremity edema noted.  Without clubbing or cyanosis.  2+ pulses x4, motor and sensation grossly intact    Consultations: none    Complications: none     Disposition: Home to self care   Discharge Condition: Good    Discharge Medications:   Discharge Medication List as of 3/18/2025 10:54 AM        START taking these medications    Details   amoxicillin clavulanate 875-125 MG Oral Tab Take 1 tablet by mouth 2 (two) times daily for 5 days., Normal, Disp-10 tablet, R-0      oxyCODONE 5 MG Oral Tab Take 1 tablet (5 mg total) by mouth every 4 (four) hours as needed for Pain., Normal, Disp-15 tablet, R-0           CONTINUE these medications which have NOT CHANGED    Details   Norgestimate-Eth Estradiol (ORTHO-CYCLEN, 28,) 0.25-35 MG-MCG Oral Tab Take 1 tablet by mouth daily., Normal, Disp-84 tablet, R-4      Multiple Vitamin (MULTI-VITAMIN DAILY) Oral Tab Take by mouth., Historical      levocetirizine 5 MG Oral Tab Take 1 tablet (5 mg total) by mouth every evening., Historical             Follow up Visits: Follow-up with [facility] general surgery in 7 to 10 day    Other Discharge Instructions:    General diet  No lifting, no driving, the patient may shower  Oxycodone for pain  Augmentin BID x 5 days.     Blanquita Caro PA-C  3/20/2025  12:11 PM

## 2025-03-24 ENCOUNTER — OFFICE VISIT (OUTPATIENT)
Facility: LOCATION | Age: 25
End: 2025-03-24
Payer: COMMERCIAL

## 2025-03-24 VITALS
HEART RATE: 84 BPM | DIASTOLIC BLOOD PRESSURE: 66 MMHG | SYSTOLIC BLOOD PRESSURE: 98 MMHG | OXYGEN SATURATION: 99 % | TEMPERATURE: 98 F

## 2025-03-24 DIAGNOSIS — Z48.03 ENCOUNTER FOR CHANGE OR REMOVAL OF DRAINS: ICD-10-CM

## 2025-03-24 DIAGNOSIS — Z90.49 STATUS POST LAPAROSCOPIC APPENDECTOMY: ICD-10-CM

## 2025-03-24 DIAGNOSIS — Z98.890 POSTOPERATIVE STATE: Primary | ICD-10-CM

## 2025-03-24 PROCEDURE — 3074F SYST BP LT 130 MM HG: CPT

## 2025-03-24 PROCEDURE — 99024 POSTOP FOLLOW-UP VISIT: CPT

## 2025-03-24 PROCEDURE — 3078F DIAST BP <80 MM HG: CPT

## 2025-03-24 NOTE — PROGRESS NOTES
Post Operative Visit Note       Active Problems  1. Postoperative state    2. Status post laparoscopic appendectomy    3. Encounter for change or removal of drains         Chief Complaint   Chief Complaint   Patient presents with    Post-Op     PO - LAPAROSCOPIC APPENDECTOMY W/ LEH. Drain removal. 18-20ml draining through out the week. This morning was 20 ml. Little pain at incision site.           History of Present Illness   The patient presents for continued care and evaluation following a laparoscopic appendectomy with drain placement with Dr. Sullivan on 3/16/2025.     Overall, the patient is doing well postoperatively. She states her pain is improving. She states she has not required pain medication for the last 3 days.  She rates her current pain at a 5/10.    The patient is tolerating a diet.  She denies nausea or vomiting.  She denies diarrhea or constipation.  She denies fevers or chills.    She states her drain has had approximately 20 mL output for the last 3 days.  She states the drainage was previously a pinkish-red and now is a light orange/yellow.    She completed her 5-day course of antibiotics.    The patient does not require a return to work note.       Allergies  Demi has No Known Allergies.    Past Medical / Surgical / Social / Family History    The past medical and past surgical history have been reviewed by me today.     Past Medical History:    PCOS (polycystic ovarian syndrome)    Seasonal allergies     Past Surgical History:   Procedure Laterality Date    Appendectomy         The family history and social history have been reviewed by me today.    Family History   Problem Relation Age of Onset    Other (thyroid) Mother         thyroid    Cancer Maternal Grandmother         ovarian cancer    Hypertension Maternal Grandfather     Diabetes Maternal Grandfather     Stroke Other     Hypertension Other     Diabetes Other      Social History     Socioeconomic History    Marital status: Single    Tobacco Use    Smoking status: Never    Smokeless tobacco: Never   Vaping Use    Vaping status: Never Used   Substance and Sexual Activity    Alcohol use: Yes     Comment: occ    Drug use: Not Currently        Current Outpatient Medications:     Multiple Vitamin (MULTI-VITAMIN DAILY) Oral Tab, Take by mouth., Disp: , Rfl:     levocetirizine 5 MG Oral Tab, Take 1 tablet (5 mg total) by mouth every evening., Disp: , Rfl:       Review of Systems  The Review of Systems has been reviewed by me during today.  Review of Systems    Physical Findings   BP 98/66 (BP Location: Left arm, Patient Position: Sitting, Cuff Size: adult)   Pulse 84   Temp 98.3 °F (36.8 °C) (Temporal)   LMP 01/02/2025   SpO2 99%   Physical Exam  Vitals and nursing note reviewed.   Constitutional:       General: She is not in acute distress.     Appearance: Normal appearance.   HENT:      Head: Normocephalic and atraumatic.      Right Ear: External ear normal.      Left Ear: External ear normal.      Nose: Nose normal.   Eyes:      General: No scleral icterus.     Conjunctiva/sclera: Conjunctivae normal.   Abdominal:      General: Abdomen is flat. There is no distension.      Palpations: Abdomen is soft. There is no mass.      Tenderness: There is no abdominal tenderness.      Hernia: No hernia is present.      Comments: Clinical exam of the abdomen reveals it to be soft, nondistended, nontender to palpation.  Laparoscopic incision sites are clean, dry, intact without surrounding erythema or cellulitis.  Skin glue remains in place.  I took the opportunity at today's visit to remove her suprapubic drain.  There was scant serosanguineous output in the drainage bulb.   Musculoskeletal:      Cervical back: Normal range of motion and neck supple.   Neurological:      Mental Status: She is alert.   Psychiatric:         Mood and Affect: Mood normal.         Behavior: Behavior normal.         Thought Content: Thought content normal.              Assessment   1. Postoperative state    2. Status post laparoscopic appendectomy    3. Encounter for change or removal of drains          Plan   The patient is doing well status post laparoscopic appendectomy with drain placement.    Pathology is pending.  I instructed her to call our office with any questions after her pathology results.    I discussed with the patient that they should refrain from any bending, pushing, pulling, twisting, or lifting of a force greater than 15-20 pounds for 6 weeks post-op. Activity restrictions were reviewed. Driving restrictions were reviewed.     The patient may shower. They should avoid scrubbing their incisions, but may allow soap and water to wash over the incisions. The patient should avoid submerging their incisions in a bath, hot tub, pool for a total of 2 weeks postoperatively.    The patient should continue a general diet.    The patient may take ibuprofen and Tylenol as needed for pain management.  They may also utilize heating pads or ice packs for comfort measures.    All of the patient's questions were answered.  The patient verbalized understanding and agreement with the plan of care.    I have no further follow-up scheduled with this patient at this time.  This patient can see a Physician Assistant or Dr. Sullivan on an as-needed basis.  This patient should return urgently for any problems or complications related to the surgical intervention.         No orders of the defined types were placed in this encounter.      Imaging & Referrals   None    Follow Up  No follow-ups on file.    Le Avery PA-C

## (undated) DEVICE — ZZ--CONVERTED-TO-500976-PENCIL SMK EVAC L10FT MPLR BLDE JAW OPN

## (undated) DEVICE — SUT MCRYL 4-0 18IN PS-2 ABSRB UD 19MM 3/8 CIR

## (undated) DEVICE — GRABBER GRASPER TIP, DISPOSABLE: Brand: RENEW

## (undated) DEVICE — TRAP,MUCUS SPECIMEN, 80CC: Brand: MEDLINE

## (undated) DEVICE — TROCAR: Brand: KII SHIELDED BLADED ACCESS SYSTEM

## (undated) DEVICE — LAPAROVUE VISIBILITY SYSTEM LAPAROSCOPIC SOLUTIONS: Brand: LAPAROVUE

## (undated) DEVICE — APPLICATOR PREP 26ML CHG 2% ISO ALC 70%

## (undated) DEVICE — SYRINGE MED 10ML LL TIP W/O SFTY DISP

## (undated) DEVICE — 40580 - THE PINK PAD - ADVANCED TRENDELENBURG POSITIONING KIT: Brand: 40580 - THE PINK PAD - ADVANCED TRENDELENBURG POSITIONING KIT

## (undated) DEVICE — SOLUTION IRRIG 1000ML 0.9% NACL USP BTL

## (undated) DEVICE — DALE ABDOMINAL BINDER, 12" WIDE, STRETCHES TO FIT 30"-45", 1 PER BOX.: Brand: DALE ABDOMINAL BINDER

## (undated) DEVICE — GLOVE SUR 6.5 SENSICARE PI PIP GRN PWD F

## (undated) DEVICE — TROCAR: Brand: KII® SLEEVE

## (undated) DEVICE — MARYLAND JAW LAPAROSCOPIC SEALER/DIVIDER COATED: Brand: LIGASURE

## (undated) DEVICE — GLOVE SUR 6.5 SENSICARE PI PIP CRM PWD F

## (undated) DEVICE — CLIP APPLIER WITH CLIP LOGIC TECHNOLOGY: Brand: ENDO CLIP III

## (undated) DEVICE — UNIVERSAL STAPLER: Brand: ENDO GIA ULTRA

## (undated) DEVICE — COVER,LIGHT,CAMERA,HARD,1/PK,STRL: Brand: MEDLINE

## (undated) DEVICE — POUCH SPECIMEN WIRE 6X3 250ML

## (undated) DEVICE — ARTICULATION RELOAD WITH TRI-STAPLE TECHNOLOGY: Brand: ENDO GIA

## (undated) DEVICE — SUT ETHLN 2-0 18IN FS NABSRB BLK 26MM 3/8 CIR

## (undated) DEVICE — LAP CHOLE/APPY CDS-LF: Brand: MEDLINE INDUSTRIES, INC.

## (undated) DEVICE — SLEEVE COMPR MD KNEE LEN SGL USE KENDALL SCD

## (undated) DEVICE — SUT COAT VCRL + 0 54IN ABSRB UD ANTIBACT

## (undated) DEVICE — ADHESIVE SKIN TOP FOR WND CLSR DERMBND ADV

## (undated) DEVICE — Device: Brand: SUTURE PASSOR PRO

## (undated) DEVICE — DRAIN SUR 19FR L0.25IN 3/4 FLUT 3/16IN TRCR

## (undated) DEVICE — SUT COAT VCRL+ 0 27IN UR-6 ABSRB VLT ANTIBACT

## (undated) DEVICE — ENDOPATH ULTRA VERESS INSUFFLATION NEEDLES WITH LUER LOCK CONNECTORS: Brand: ENDOPATH

## (undated) DEVICE — #15 STERILE STAINLESS BLADE: Brand: STERILE STAINLESS BLADES

## (undated) DEVICE — EVACUATOR SUR 100CC SIL BLB WND

## (undated) DEVICE — TRAY CATH 16FR F INCL BARDX IC COMPLT CARE

## (undated) NOTE — LETTER
[address]  [address]  Authorization for Surgical Operation and Procedure     Date:___________                                                                                                         Time:__________  I hereby authorize Surgeon(s):  Luz Elena Sullivan MD, my physician and his/her assistants (if applicable), which may include medical students, residents, and/or fellows, to perform the following surgical operation/ procedure and administer such anesthesia as may be determined necessary by my physician:  Operation/Procedure name (s) Procedure(s):  LAPAROSCOPIC APPENDECTOMY POSSIBLE OPEN on Demikavon Mullins   2.   I recognize that during the surgical operation/procedure, unforeseen conditions may necessitate additional or different procedures than those listed above.  I, therefore, further authorize and request that the above-named surgeon, assistants, or designees perform such procedures as are, in their judgment, necessary and desirable.    3.   My surgeon/physician has discussed prior to my surgery the potential benefits, risks and side effects of this procedure; the likelihood of achieving goals; and potential problems that might occur during recuperation.  They also discussed reasonable alternatives to the procedure, including risks, benefits, and side effects related to the alternatives and risks related to not receiving this procedure.  I have had all my questions answered and I acknowledge that no guarantee has been made as to the result that may be obtained.    4.   Should the need arise during my operation/procedure, which includes change of level of care prior to discharge, I also consent to the administration of blood and/or blood products.  Further, I understand that despite careful testing and screening of blood or blood products by collecting agencies, I may still be subject to ill effects as a result of receiving a blood transfusion and/or blood products.   The following are some, but not all, of the potential risks that can occur: fever and allergic reactions, hemolytic reactions, transmission of diseases such as Hepatitis, AIDS and Cytomegalovirus (CMV) and fluid overload.  In the event that I wish to have an autologous transfusion of my own blood, or a directed donor transfusion, I will discuss this with my physician.  Check only if Refusing Blood or Blood Products  I understand refusal of blood or blood products as deemed necessary by my physician may have serious consequences to my condition to include possible death. I hereby assume responsibility for my refusal and release the hospital, its personnel, and my physicians from any responsibility for the consequences of my refusal.          o  Refuse      5.   I authorize the use of any specimen, organs, tissues, body parts or foreign objects that may be removed from my body during the operation/procedure for diagnosis, research or teaching purposes and their subsequent disposal by hospital authorities.  I also authorize the release of specimen test results and/or written reports to my treating physician on the hospital medical staff or other referring or consulting physicians involved in my care, at the discretion of the Pathologist or my treating physician.    6.   I consent to the photographing or videotaping of the operations or procedures to be performed, including appropriate portions of my body for medical, scientific, or educational purposes, provided my identity is not revealed by the pictures or by descriptive texts accompanying them.  If the procedure has been photographed/videotaped, the surgeon will obtain the original picture, image, videotape or CD.  The hospital will not be responsible for storage, release or maintenance of the picture, image, tape or CD.    7.   I consent to the presence of a  or observers in the operating room as deemed necessary by my physician or their  designees.    8.   I recognize that in the event my procedure results in extended X-Ray/fluoroscopy time, I may develop a skin reaction.    9. If I have a Do Not Attempt Resuscitation (DNAR) order in place, that status will be suspended while in the operating room, procedural suite, and during the recovery period unless otherwise explicitly stated by me (or a person authorized to consent on my behalf). The surgeon or my attending physician will determine when the applicable recovery period ends for purposes of reinstating the DNAR order.  10. Patients having a sterilization procedure: I understand that if the procedure is successful the results will be permanent and it will therefore be impossible for me to inseminate, conceive, or bear children.  I also understand that the procedure is intended to result in sterility, although the result has not been guaranteed.   11. I acknowledge that my physician has explained sedation/analgesia administration to me including the risk and benefits I consent to the administration of sedation/analgesia as may be necessary or desirable in the judgment of my physician.    I CERTIFY THAT I HAVE READ AND FULLY UNDERSTAND THE ABOVE CONSENT TO OPERATION and/or OTHER PROCEDURE.    _________________________________________  __________________________________  Signature of Patient     Signature of Responsible Person         ___________________________________         Printed Name of Responsible Person           _________________________________                 Relationship to Patient  _________________________________________  ______________________________  Signature of Witness          Date  Time      Patient Name: Demi Mullins     : 2000                 Printed: 2025     Medical Record #: [id number]                     Page 1 of [address] [place] [address] [place]  [address]    Consent for Anesthesia    I, Demi Mullins  agree to be cared for by an anesthesiologist, who is specially trained to monitor me and give me medicine to put me to sleep or keep me comfortable during my procedure    I understand that my anesthesiologist is not an employee or agent of [facility] or [employer]. He or she works for [employer].    As the patient asking for anesthesia services, I agree to:  Allow the anesthesiologist (anesthesia doctor) to give me medicine and do additional procedures as necessary. Some examples are: Starting or using an “IV” to give me medicine, fluids or blood during my procedure, and having a breathing tube placed to help me breathe when I’m asleep (intubation). In the event that my heart stops working properly, I understand that my anesthesiologist will make every effort to sustain my life, unless otherwise directed by [facility] Do Not Resuscitate documents.  Tell my anesthesia doctor before my procedure:  If I am pregnant.  The last time that I ate or drank.  All of the medicines I take (including prescriptions, herbal supplements, and pills I can buy without a prescription (including street drugs/illegal medications). Failure to inform my anesthesiologist about these medicines may increase my risk of anesthetic complications.  If I am allergic to anything or have had a reaction to anesthesia before.  I understand how the anesthesia medicine will help me (benefits).  I understand that with any type of anesthesia medicine there are risks:  The most common risks are: nausea, vomiting, sore throat, muscle soreness, damage to my eyes, mouth, or teeth (from breathing tube placement).  Rare risks include: remembering what happened during my procedure, allergic reactions to medications, injury to my airway, heart, lungs, vision, nerves, or muscles and in extremely rare instances death.  My doctor has explained to me other choices available to me for my care (alternatives).  Pregnant Patients  (“epidural”):  I understand that the risks of having an epidural (medicine given into my back to help control pain during labor), include itching, low blood pressure, difficulty urinating, headache or slowing of the baby’s heart. Very rare risks include infection, bleeding, seizure, irregular heart rhythms and nerve injury.  Regional Anesthesia (“spinal”, “epidural”, & “nerve blocks”):  I understand that rare but potential complications include headache, bleeding, infection, seizure, irregular heart rhythms, and nerve injury.    I can change my mind about having anesthesia services at any time before I get the medicine.    _____________________________________________________________________________  Patient (or Representative) Signature/Relationship to Patient  Date   Time    _____________________________________________________________________________   Name (if used)    Language/Organization   Time    _____________________________________________________________________________  Anesthesiologist Signature     Date   Time  I have discussed the procedure and information above with the patient (or patient’s representative) and answered their questions. The patient or their representative has agreed to have anesthesia services.    _____________________________________________________________________________  Witness        Date   Time  I have verified that the signature is that of the patient or patient’s representative, and that it was signed before the procedure  Patient Name: Demi Mullins     : 2000                 Printed: 2025     Medical Record #: [id number]                     Page 2 of 2